# Patient Record
Sex: MALE | URBAN - METROPOLITAN AREA
[De-identification: names, ages, dates, MRNs, and addresses within clinical notes are randomized per-mention and may not be internally consistent; named-entity substitution may affect disease eponyms.]

---

## 2019-11-02 ENCOUNTER — INPATIENT (INPATIENT)
Facility: HOSPITAL | Age: 54
LOS: 2 days | Discharge: HOME | DRG: 305 | End: 2019-11-05
Attending: INTERNAL MEDICINE | Admitting: INTERNAL MEDICINE
Payer: COMMERCIAL

## 2019-11-02 VITALS
OXYGEN SATURATION: 99 % | DIASTOLIC BLOOD PRESSURE: 116 MMHG | TEMPERATURE: 99 F | WEIGHT: 179.9 LBS | HEART RATE: 55 BPM | SYSTOLIC BLOOD PRESSURE: 218 MMHG | RESPIRATION RATE: 17 BRPM

## 2019-11-02 LAB
ALBUMIN SERPL ELPH-MCNC: 4.3 G/DL — SIGNIFICANT CHANGE UP (ref 3.3–5)
ALP SERPL-CCNC: 87 U/L — SIGNIFICANT CHANGE UP (ref 40–120)
ALT FLD-CCNC: 6 U/L — LOW (ref 10–45)
ANION GAP SERPL CALC-SCNC: 8 MMOL/L — SIGNIFICANT CHANGE UP (ref 5–17)
APTT BLD: 29 SEC — SIGNIFICANT CHANGE UP (ref 27.5–36.3)
AST SERPL-CCNC: 12 U/L — SIGNIFICANT CHANGE UP (ref 10–40)
BASOPHILS # BLD AUTO: 0.03 K/UL — SIGNIFICANT CHANGE UP (ref 0–0.2)
BASOPHILS NFR BLD AUTO: 0.6 % — SIGNIFICANT CHANGE UP (ref 0–2)
BILIRUB SERPL-MCNC: 0.5 MG/DL — SIGNIFICANT CHANGE UP (ref 0.2–1.2)
BUN SERPL-MCNC: 9 MG/DL — SIGNIFICANT CHANGE UP (ref 7–23)
CALCIUM SERPL-MCNC: 9.1 MG/DL — SIGNIFICANT CHANGE UP (ref 8.4–10.5)
CHLORIDE SERPL-SCNC: 103 MMOL/L — SIGNIFICANT CHANGE UP (ref 96–108)
CK MB CFR SERPL CALC: 1.8 NG/ML — SIGNIFICANT CHANGE UP (ref 0–6.7)
CK SERPL-CCNC: 112 U/L — SIGNIFICANT CHANGE UP (ref 30–200)
CO2 SERPL-SCNC: 28 MMOL/L — SIGNIFICANT CHANGE UP (ref 22–31)
CREAT SERPL-MCNC: 1.43 MG/DL — HIGH (ref 0.5–1.3)
EOSINOPHIL # BLD AUTO: 0.05 K/UL — SIGNIFICANT CHANGE UP (ref 0–0.5)
EOSINOPHIL NFR BLD AUTO: 1.1 % — SIGNIFICANT CHANGE UP (ref 0–6)
GLUCOSE SERPL-MCNC: 86 MG/DL — SIGNIFICANT CHANGE UP (ref 70–99)
HCT VFR BLD CALC: 39.5 % — SIGNIFICANT CHANGE UP (ref 39–50)
HGB BLD-MCNC: 12.9 G/DL — LOW (ref 13–17)
IMM GRANULOCYTES NFR BLD AUTO: 0.2 % — SIGNIFICANT CHANGE UP (ref 0–1.5)
INR BLD: 1.08 — SIGNIFICANT CHANGE UP (ref 0.88–1.16)
LIDOCAIN IGE QN: 16 U/L — SIGNIFICANT CHANGE UP (ref 7–60)
LYMPHOCYTES # BLD AUTO: 1.55 K/UL — SIGNIFICANT CHANGE UP (ref 1–3.3)
LYMPHOCYTES # BLD AUTO: 32.8 % — SIGNIFICANT CHANGE UP (ref 13–44)
MCHC RBC-ENTMCNC: 31.4 PG — SIGNIFICANT CHANGE UP (ref 27–34)
MCHC RBC-ENTMCNC: 32.7 GM/DL — SIGNIFICANT CHANGE UP (ref 32–36)
MCV RBC AUTO: 96.1 FL — SIGNIFICANT CHANGE UP (ref 80–100)
MONOCYTES # BLD AUTO: 0.47 K/UL — SIGNIFICANT CHANGE UP (ref 0–0.9)
MONOCYTES NFR BLD AUTO: 9.9 % — SIGNIFICANT CHANGE UP (ref 2–14)
NEUTROPHILS # BLD AUTO: 2.62 K/UL — SIGNIFICANT CHANGE UP (ref 1.8–7.4)
NEUTROPHILS NFR BLD AUTO: 55.4 % — SIGNIFICANT CHANGE UP (ref 43–77)
NRBC # BLD: 0 /100 WBCS — SIGNIFICANT CHANGE UP (ref 0–0)
NT-PROBNP SERPL-SCNC: 1287 PG/ML — HIGH (ref 0–300)
PCP SPEC-MCNC: SIGNIFICANT CHANGE UP
PLATELET # BLD AUTO: 132 K/UL — LOW (ref 150–400)
POTASSIUM SERPL-MCNC: 4 MMOL/L — SIGNIFICANT CHANGE UP (ref 3.5–5.3)
POTASSIUM SERPL-SCNC: 4 MMOL/L — SIGNIFICANT CHANGE UP (ref 3.5–5.3)
PROT SERPL-MCNC: 7 G/DL — SIGNIFICANT CHANGE UP (ref 6–8.3)
PROTHROM AB SERPL-ACNC: 12.2 SEC — SIGNIFICANT CHANGE UP (ref 10–12.9)
RBC # BLD: 4.11 M/UL — LOW (ref 4.2–5.8)
RBC # FLD: 12.8 % — SIGNIFICANT CHANGE UP (ref 10.3–14.5)
SODIUM SERPL-SCNC: 139 MMOL/L — SIGNIFICANT CHANGE UP (ref 135–145)
TROPONIN T SERPL-MCNC: <0.01 NG/ML — SIGNIFICANT CHANGE UP (ref 0–0.01)
TROPONIN T SERPL-MCNC: <0.01 NG/ML — SIGNIFICANT CHANGE UP (ref 0–0.01)
WBC # BLD: 4.73 K/UL — SIGNIFICANT CHANGE UP (ref 3.8–10.5)
WBC # FLD AUTO: 4.73 K/UL — SIGNIFICANT CHANGE UP (ref 3.8–10.5)

## 2019-11-02 PROCEDURE — 93010 ELECTROCARDIOGRAM REPORT: CPT | Mod: 76

## 2019-11-02 PROCEDURE — 99291 CRITICAL CARE FIRST HOUR: CPT

## 2019-11-02 PROCEDURE — 71045 X-RAY EXAM CHEST 1 VIEW: CPT | Mod: 26

## 2019-11-02 RX ORDER — HYDRALAZINE HCL 50 MG
25 TABLET ORAL EVERY 8 HOURS
Refills: 0 | Status: DISCONTINUED | OUTPATIENT
Start: 2019-11-02 | End: 2019-11-02

## 2019-11-02 RX ORDER — AMLODIPINE BESYLATE 2.5 MG/1
5 TABLET ORAL DAILY
Refills: 0 | Status: DISCONTINUED | OUTPATIENT
Start: 2019-11-02 | End: 2019-11-02

## 2019-11-02 RX ORDER — LABETALOL HCL 100 MG
10 TABLET ORAL ONCE
Refills: 0 | Status: COMPLETED | OUTPATIENT
Start: 2019-11-02 | End: 2019-11-02

## 2019-11-02 RX ORDER — HYDRALAZINE HCL 50 MG
10 TABLET ORAL ONCE
Refills: 0 | Status: COMPLETED | OUTPATIENT
Start: 2019-11-02 | End: 2019-11-02

## 2019-11-02 RX ORDER — HYDRALAZINE HCL 50 MG
25 TABLET ORAL EVERY 8 HOURS
Refills: 0 | Status: DISCONTINUED | OUTPATIENT
Start: 2019-11-02 | End: 2019-11-03

## 2019-11-02 RX ORDER — AMLODIPINE BESYLATE 2.5 MG/1
5 TABLET ORAL DAILY
Refills: 0 | Status: DISCONTINUED | OUTPATIENT
Start: 2019-11-03 | End: 2019-11-04

## 2019-11-02 RX ORDER — NICARDIPINE HYDROCHLORIDE 30 MG/1
5 CAPSULE, EXTENDED RELEASE ORAL
Qty: 40 | Refills: 0 | Status: DISCONTINUED | OUTPATIENT
Start: 2019-11-02 | End: 2019-11-04

## 2019-11-02 RX ORDER — HYDRALAZINE HCL 50 MG
25 TABLET ORAL ONCE
Refills: 0 | Status: COMPLETED | OUTPATIENT
Start: 2019-11-02 | End: 2019-11-02

## 2019-11-02 RX ORDER — CHLORHEXIDINE GLUCONATE 213 G/1000ML
1 SOLUTION TOPICAL
Refills: 0 | Status: DISCONTINUED | OUTPATIENT
Start: 2019-11-02 | End: 2019-11-04

## 2019-11-02 RX ADMIN — Medication 10 MILLIGRAM(S): at 11:31

## 2019-11-02 RX ADMIN — AMLODIPINE BESYLATE 5 MILLIGRAM(S): 2.5 TABLET ORAL at 15:20

## 2019-11-02 RX ADMIN — Medication 10 MILLIGRAM(S): at 12:15

## 2019-11-02 RX ADMIN — Medication 25 MILLIGRAM(S): at 21:54

## 2019-11-02 RX ADMIN — NICARDIPINE HYDROCHLORIDE 25 MG/HR: 30 CAPSULE, EXTENDED RELEASE ORAL at 16:54

## 2019-11-02 RX ADMIN — Medication 25 MILLIGRAM(S): at 12:57

## 2019-11-02 RX ADMIN — Medication 10 MILLIGRAM(S): at 12:57

## 2019-11-02 RX ADMIN — Medication 10 MILLIGRAM(S): at 16:11

## 2019-11-02 NOTE — ED PROVIDER NOTE - OBJECTIVE STATEMENT
54M pmh smoking, no other known dz -has not seen MD in ~10yrs, denies hx htn, hld, dm, p/w htn & abnormal ekg. Pt seen at  for physical for job at . BP noted for 190/110 and EKG w/ TWI, thus sent to ED. Pt w/o somatic complaint, no cp, no sob, no n/v, no vision change, no ha, no f/c.

## 2019-11-02 NOTE — ED PROVIDER NOTE - NS ED MD EKG STATUS PRIOR 2
Dear ePdro Pablo Reina D.O. , Thank you for the referral of Mini Howe.  Overall significant improvement with the patient.  Please see my note for more details Should you have any questions or concerns please do not hesitate to contact me. Markel Arellano M.D.  the EKG is unchanged from prior EKG

## 2019-11-02 NOTE — H&P ADULT - NSHPLABSRESULTS_GEN_ALL_CORE
12.9   4.73  )-----------( 132      ( 02 Nov 2019 11:14 )             39.5     11-02    139  |  103  |  9   ----------------------------<  86  4.0   |  28  |  1.43<H>    Ca    9.1      02 Nov 2019 11:14    TPro  7.0  /  Alb  4.3  /  TBili  0.5  /  DBili  x   /  AST  12  /  ALT  6<L>  /  AlkPhos  87  11-02    LIVER FUNCTIONS - ( 02 Nov 2019 11:14 )  Alb: 4.3 g/dL / Pro: 7.0 g/dL / ALK PHOS: 87 U/L / ALT: 6 U/L / AST: 12 U/L / GGT: x           PT/INR - ( 02 Nov 2019 11:14 )   PT: 12.2 sec;   INR: 1.08          PTT - ( 02 Nov 2019 11:14 )  PTT:29.0 sec    CARDIAC MARKERS ( 02 Nov 2019 15:05 )  x     / <0.01 ng/mL / x     / x     / x      CARDIAC MARKERS ( 02 Nov 2019 11:14 )  x     / <0.01 ng/mL / 112 U/L / x     / 1.8 ng/mL        Radiology and other tests: Reviewed

## 2019-11-02 NOTE — ED ADULT TRIAGE NOTE - PAIN: PRESENCE, MLM
SUBJECTIVE:   Antonio Canseco is a 49 year old male who presents to clinic today for the following health issues:    Morbid obesity (H)  (primary encounter diagnosis)  Body mass index is 46.72 kg/(m^2).     - Interested in weight loss program    Irritable bowel syndrome without diarrhea - Bilateral abdominal pain when urinary urgency occurs. Interested in further Tx. Does not occur as frequently as it used to, though does occur intermittently.     Essential hypertension controlledHypertension Follow-up  BP Readings from Last 6 Encounters:   10/05/18 117/78   06/01/18 (!) 152/98   04/12/18 (!) 142/94   01/18/16 148/88   08/25/15 128/83   12/22/14 156/90   Outpatient blood pressures are not being checked.    Low Salt Diet: no added salt    Amount of exercise or physical activity: None    Problems taking medications regularly: No    Medication side effects: dry mouth and constipation     Diet: regular (no restrictions)    History of colonic polyps - Receivedletter from gastro about colonoscopy (due). States that he has been waiting for winter to schedule.      Screening for HIV (human immunodeficiency virus) routine    Current Outpatient Prescriptions   Medication     buPROPion (WELLBUTRIN XL) 150 MG 24 hr tablet     DULoxetine (CYMBALTA) 60 MG EC capsule     lisinopril (PRINIVIL/ZESTRIL) 20 MG tablet     No current facility-administered medications for this visit.          Problem list and histories reviewed & adjusted, as indicated.  Additional history: as documented    Past Medical History:   Diagnosis Date     CARDIOVASCULAR SCREENING; LDL GOAL LESS THAN 160 3/27/2012     Colon adenoma      Cough 2/4/2014     Degeneration of thoracic intervertebral disc 12/11/2013     Elevated blood pressure 12/22/2014     Hepatic cyst 5/22/2018     Hepatic steatosis 12/11/2013     History of colonic polyps 12/11/2013     Irritable bowel syndrome without diarrhea 6/1/2018     Microscopic hematuria 12/19/2013     Nephrolithiasis   [facility] PROGRESS NOTE    12/5/2018 12:56 PM        Name: Annika Peña . Admitted: 11/28/2018  Primary Care Provider: Alissa Machuca MD (Tel: [phone])    Brief Course:  Admitted with hypoxia    Subjective: better     Reviewed interval ancillary notes    Current Medications    lisinopril (PRINIVIL;ZESTRIL) tablet 2.5 mg Daily   amLODIPine (NORVASC) tablet 2.5 mg Nightly   warfarin (COUMADIN) tablet 5 mg Daily   spironolactone (ALDACTONE) tablet 12.5 mg Daily   sodium chloride flush 0.9 % injection 10 mL PRN   vitamin C (ASCORBIC ACID) tablet 500 mg Daily   ferrous sulfate tablet 325 mg BID   levothyroxine (SYNTHROID) tablet 75 mcg Daily   pantoprazole (PROTONIX) tablet 40 mg QAM AC   sodium chloride flush 0.9 % injection 10 mL 2 times per day   sodium chloride flush 0.9 % injection 10 mL PRN   magnesium hydroxide (MILK OF MAGNESIA) 400 MG/5ML suspension 30 mL Daily PRN   ondansetron (ZOFRAN) injection 4 mg Q6H PRN       Objective:  /68   Pulse 88   Temp 99 °F (37.2 °C) (Temporal)   Resp 16   Ht 5' 2\" (1.575 m)   Wt 161 lb 3.2 oz (73.1 kg)   SpO2 94%   BMI 29.48 kg/m²     Intake/Output Summary (Last 24 hours) at 12/05/18 1256  Last data filed at 12/05/18 0912   Gross per 24 hour   Intake              600 ml   Output                0 ml   Net              600 ml      Wt Readings from Last 3 Encounters:   12/05/18 161 lb 3.2 oz (73.1 kg)   11/08/18 130 lb (59 kg)   10/19/18 145 lb (65.8 kg)       General appearance:  Appears comfortable  Eyes: Sclera clear. Pupils equal.  ENT: Moist oral mucosa. Trachea midline, no adenopathy. Cardiovascular: Regular rhythm, normal S1, S2. No murmur. No edema in lower extremities  Respiratory: Not using accessory muscles. Good inspiratory effort. Clear to auscultation bilaterally, no wheeze or crackles.    GI: Abdomen soft, no tenderness, not distended, normal bowel     Obesity 3/27/2012     BRIAN on CPAP 3/27/2012     Other irritable bowel syndrome 5/22/2018     Pain in thoracic spine 5/9/2013     Pulmonary nodule 5/22/2018     Right-sided chest pain 4/13/2018     Sleep apnea      Past Surgical History:   Procedure Laterality Date     COLONOSCOPY       CYSTOSCOPY  2/11/2014    Procedure: CYSTOSCOPY;   Video Cystoscopy with Exam Under Anesthesia;  Surgeon: Chente Moeller MD;  Location: [facility] OR     wisdom teeth       Family History   Problem Relation Age of Onset     Family History Negative Mother      Cancer Father      lymphoma     Social History   Substance Use Topics     Smoking status: Former Smoker     Packs/day: 1.00     Types: Cigarettes     Quit date: 4/23/2013     Smokeless tobacco: Never Used      Comment: 1 sd/day     Alcohol use Yes      Comment: rarely     Reviewed and updated as needed this visit by clinical staff  Allergies  Meds       Reviewed and updated as needed this visit by Provider       ROS: no hematochezia, hematuria, no systemic symptoms, no thyroid sx    This document serves as a record of the services and decisions personally performed and made by Mynor Mason MD. It was created on his behalf by Blair Sahu, a trained medical scribe.  The creation of this document is based on the scribe's personal observations and the provider's statements to the medical scribe.  Blair Sahu, October 5, 2018 2:56 PM  OBJECTIVE:   /78 (BP Location: Left arm, Patient Position: Chair, Cuff Size: Adult Large)  Pulse 64  Temp 97.9  F (36.6  C) (Oral)  Resp 16  Wt (!) 152 kg (335 lb)  SpO2 98%  BMI 46.72 kg/m2  Body mass index is 46.72 kg/(m^2).  GENERAL: Healthy, Alert, No acute distress   ABDOMEN: Obese, benign    Diagnostic Test Results:  No results found for this or any previous visit (from the past 24 hour(s)).  ASSESSMENT/PLAN:   (E66.01) Morbid obesity (H)  (primary encounter diagnosis)  Comment: Multidisciplinary weight loss program  discussed.  Plan: BARIATRIC ADULT REFERRAL        30 grams fiber (3 servings vegetables /  fruits each meal)        1 serving carbohydrate (bread potatoes) daily    (K58.9) Irritable bowel syndrome without diarrhea  Comment: Refilled   Plan: DULoxetine (CYMBALTA) 60 MG EC capsule,        Add BuPROPion (WELLBUTRIN XL) 150 MG 24 hr tablet          (I10) Essential hypertension  Comment: Controlled   Plan: lisinopril (PRINIVIL/ZESTRIL) 20 MG tablet,         Comprehensive metabolic panel          (Z86.010) History of colonic polyps  Comment: Patient agreed to schedule   Plan: GASTROENTEROLOGY ADULT REF PROCEDURE ONLY         Morenita Garcia [phone]; No Provider         Preference          (Z11.4) Screening for HIV (human immunodeficiency virus)  Comment: Universal HIV testing introduced, rationale reviewed, all questions answered, permission granted to test  Plan: HIV Antigen Antibody Combo          The information in this document, created by the medical scribe for me, accurately reflects the services I personally performed and the decisions made by me. I have reviewed and approved this document for accuracy prior to leaving the patient care area.  Mynor Mason MD October 5, 2018 2:57 PM    Mynor Mason MD  [facility]    hypertension) ([facility] [address]    Nonrheumatic aortic valve stenosis    Pulmonary hypertension (HCC)    Acute encephalopathy  Resolved Problems:    * No resolved hospital problems. *       Assessment & Plan:       Acute renal failure, resolved, low-dose lisinopril per nephrology recommendation, appreciate nephrology input, follow up PCP/nephrology for outpatient monitoring of creatinine. Hypotension due to antihypertensives : Resolvedmedications Adjusted  Hypokalemia, replaced  Acute metabolic encephalopathy ;   resolved, CT head negative for acute intracranial hemorrhage, apt. Neurology input;    chronic hypoxic resp. Failure; multifactorial; home  O2 ordered;  FU Pulmonary   VQ scan noted to be high probability  for PE:  Possible  CTEPH.   No pulmonary angiogram; Patient is not interested in aggressive management  Chronic anticoagulation,  Changed   to coumadin per oncology  ; FU INR   Remote history of smoking   History of pulmonary embolism  Pulmonary hypertension, likely multifactorial due to aortic stenosis, chronic diastolic HF, appreciate cardiology input  Synovial cyst of knee   joint: Follow PCP  Disposition: Home with home health care  Follow-up, PCP, cardiology, nephrology, pulmonary, hematology  Discussed with patient management/discharge plan          Ynacy Martel MD   12/5/2018 12:56 PM denies pain/discomfort

## 2019-11-02 NOTE — H&P ADULT - HISTORY OF PRESENT ILLNESS
55 y/o man, smoker, with no known PMHx who presents to ED from urgent care with HTN and abnormal EKG. Patient visited urgent care for employment physical. Concern for HTN emergency with sBP 190s/110 with EKG TWI in leads I, aVL, v5, and V6. On arrival at [facility] ED, pt with sBP in 210s-240s. Patient denies any CP, SOB, nausea, vomiting, vision changes, headaches, fevers, or chills.    ED Vitals: T 98.6F, /116, HR 55, RR 17, SpO2 99 on RA  ED Labs: WBC 4.73, Hb 12.9, Plt 132, K+ 4.0, Cr 1.43, BUN 9, AST 12, ALT 6, Troponin <0.01, CKMB 1.8, pro-BNP 1287  ED management: s/p labetalol 10mg IVP x2, IV hydralazine 10mg x1, 25mg PO hydralazine x1 53 y/o man, smoker, with no known PMHx who presents to ED from urgent care with HTN and abnormal EKG. Patient visited urgent care today for his employment physical to work at a . Pt was sent to ED due to concern for HTN emergency with sBP 190s/110 with EKG TWI in leads I, aVL, v5, and V6. On arrival at [facility] ED, pt with sBP in 210s-240s. Patient denies any CP, SOB, palpitations, cough, nausea, vomiting, vision changes, headaches, fevers, or chills, difficulty with urination.    ED Vitals: T 98.6F, /116, HR 55, RR 17, SpO2 99 on RA  ED Labs: WBC 4.73, Hb 12.9, Plt 132, K+ 4.0, Cr 1.43, BUN 9, AST 12, ALT 6, Troponin <0.01, CKMB 1.8, pro-BNP 1287  ED management: s/p labetalol 10mg IVP x2, IV hydralazine 10mg x1, 25mg PO hydralazine x1 55 y/o man, smoker, with no known PMHx who presents to ED from urgent care with HTN and abnormal EKG. Patient visited urgent care today for his employment physical to work at a . Pt was sent to ED due to concern for HTN emergency with sBP 190s/110 with EKG TWI in leads I, aVL, v5, and V6. On arrival at [facility] ED, pt with sBP in 210s-240s. Patient denies any CP, SOB, palpitations, cough, nausea, vomiting, vision changes, headaches, fevers, or chills, difficulty with urination.    ED Vitals: T 98.6F, /116, HR 55, RR 17, SpO2 99 on RA  ED Labs: WBC 4.73, Hb 12.9, Plt 132, K+ 4.0, Cr 1.43, BUN 9, AST 12, ALT 6, Troponin <0.01, CKMB 1.8, pro-BNP 1287  ED imaging/tests: EKG with TWI in leads I, aVL, V5, V6 and ST elevations in V1, V2, V3. CXR without acute pathologies, no infiltrates.  ED management: s/p labetalol 10mg IVP x2, IV hydralazine 10mg x1, 25mg PO hydralazine x1 55 y/o man, smoker, with no known PMHx who presents to ED from urgent care with HTN and abnormal EKG. Patient visited urgent care today for his employment physical to work at a . Pt was sent to ED due to concern for HTN emergency with sBP 190s/110 with EKG TWI in leads I, aVL, v5, and V6. On arrival at [facility] ED, pt with sBP in 210s-240s. Patient denies any CP, SOB, palpitations, cough, nausea, vomiting, vision changes, headaches, fevers, or chills, difficulty with urination.    ED Vitals: T 98.6F, /116, HR 55, RR 17, SpO2 99 on RA  ED Labs: WBC 4.73, Hb 12.9, Plt 132, K+ 4.0, Cr 1.43, BUN 9, AST 12, ALT 6, Troponin <0.01, CKMB 1.8, pro-BNP 1287, UDS +THC  ED imaging/tests: EKG with TWI in leads I, aVL, V5, V6 and ST elevations in V1, V2, V3. CXR without acute pathologies, no infiltrates.  ED management: s/p labetalol 10mg IVP x2, IV hydralazine 10mg x1, 25mg PO hydralazine x1 55 y/o man with no known PMHx who presents to ED from urgent care with HTN and abnormal EKG. Patient visited urgent care today for his employment physical to work at a . Pt was sent to ED due to concern for HTN emergency with sBP 190s/110 with EKG TWI in leads I, aVL, v5, and V6. On arrival at [facility] ED, pt with sBP in 210s-240s. Patient denies any CP, SOB, palpitations, cough, nausea, vomiting, vision changes, headaches, fevers, or chills, difficulty with urination.    ED Vitals: T 98.6F, /116, HR 55, RR 17, SpO2 99 on RA  ED Labs: WBC 4.73, Hb 12.9, Plt 132, K+ 4.0, Cr 1.43, BUN 9, AST 12, ALT 6, Troponin <0.01, CKMB 1.8, pro-BNP 1287, UDS +THC  ED imaging/tests: EKG with TWI in leads I, aVL, V5, V6 and ST elevations in V1, V2, V3. CXR without acute pathologies, no infiltrates.  ED management: s/p labetalol 10mg IVP x2, IV hydralazine 10mg x1, 25mg PO hydralazine x1

## 2019-11-02 NOTE — H&P ADULT - ATTENDING COMMENTS
53 y/o man with no known PMHx who presents to ED from urgent care with HTN and abnormal EKG. Patient visited urgent care today for his employment physical to work at a . Pt was sent to ED due to concern for HTN emergency with sBP 190s/110 with EKG TWI in leads I, aVL, v5, and V6. On arrival at [facility] ED, pt with sBP in 210s-240s. Patient denies any CP, SOB, palpitations, cough, nausea, vomiting, vision changes, headaches, fevers, or chills, difficulty with urination.    ED Vitals: T 98.6F, /116, HR 55, RR 17, SpO2 99 on RA    EKG TWI in leads I, aVL, v5, and V6. Started on oral anti-HTN medications and initiated nicardipine gtt due to persistent sBP>200s.    Long discussion about need to treat hypertension on a chronic basis, even if no symptoms. Emphasized taking meds, risk of MI and CVA, regular follow-up and 2 gm Na diet.     CARDIOVASCULAR  #HTN urgency  - sBP persistently >200s, asymptomatic, no CP, SOB, headaches, difficulty with urination  - home BP unknown, pt has not seen physician in about 10 years  Will use a regiment of amlodipine, hydralazine and Lisinopril. Do not want to lower BP too rapidly.  May need 4th agent (consider chlorthalidone) in near future after current 3 meds maximized.    #T-wave Inversions  - EKG with TWI in leads I, aVL, v5, and V6 at urgent care  LVH  - Troponins neg x2  - UDS positive for only THC  I have personally provided 30 minutes of critical care time concurrently with the fellow.  I have reviewed the fellow’s documentation and I agree with the fellow's assessment and plan of care.  JAYSHREE Frost

## 2019-11-02 NOTE — ED PROVIDER NOTE - CLINICAL SUMMARY MEDICAL DECISION MAKING FREE TEXT BOX
54M pmh smoking, no other known dz -has not seen MD in ~10yrs, denies hx htn, hld, dm, p/w htn & abnormal ekg. Pt seen at  for physical for job at . BP noted for 190/110 and EKG w/ TWI, thus sent to ED. Pt w/o somatic complaint, no cp, no sob, no n/v, no vision change, no ha, no f/c. Exam w/ htn, otherwise w/o finding. EKG OSH shows TWI I/aVL, v5/v6, Concern htn emergency given  and 233 on repeat, LBBB does not meet sgarbossa criteria, consider acs, untreated htn. 54M pmh smoking, no other known dz -has not seen MD in ~10yrs, denies hx htn, hld, dm, p/w htn & abnormal ekg. Pt seen at  for physical for job at . BP noted for 190/110 and EKG w/ TWI, thus sent to ED. Pt w/o somatic complaint, no cp, no sob, no n/v, no vision change, no ha, no f/c. Exam w/ htn, otherwise w/o finding. EKG OSH shows TWI I/aVL, v5/v6, Concern htn emergency given  and 233 on repeat, LBBB does not meet sgarbossa criteria, consider acs, untreated htn. Pt required multiple IV doses of labetalol, then hydralazine PO & IV, still hypertensive, admitted CCU

## 2019-11-02 NOTE — ED PROVIDER NOTE - DIAGNOSTIC INTERPRETATION
Chest x-ray interpreted by ER Physician Dr. Hall  Findings: heart size normal, no infiltrates, no pleural effusion, no PTX, no appreciated fracture.

## 2019-11-02 NOTE — ED PROVIDER NOTE - NOTES
Advised hx, EKG, exam, tx thus far, BP still elevated after 10 labetalol but will recheck in few minutes, labs pending.

## 2019-11-02 NOTE — ED PROVIDER NOTE - PROGRESS NOTE DETAILS
/113 after labetolol, rpt dose given. Cards at bedside, advised neg trop, BNP 1200~, BP elevated again, cards at bedside, recs admit ccu given efforts thus far to control bp ineffective.

## 2019-11-02 NOTE — ED ADULT NURSE NOTE - OBJECTIVE STATEMENT
55 y/o male BIBA for cardiac evaluation. as per report " pt was getting a physical for a new job and found to have st- inversions on the ekg and hypertensive" pt   denies any medical problems, cp, sob but states " didn't have insurance and haven't check himself in a while" pt noted hypertensive, kept on cardiac monitoring, MD Hall at bedside, evaluating pt,.

## 2019-11-02 NOTE — H&P ADULT - ASSESSMENT
55 y/o man, current smoker, with no known PMHx and has not seen a physician in 10 years, who presents to ED for HTN and abnormal EKG. Admitted for HTN emergency sBP>200s with EKG TWI in leads I, aVL, v5, and V6.     CARDIOVASCULAR  #HTN Emergency  - sBP persistently >200s, asymptomatic, no CP, SOB, headaches, difficulty with urination  - s/p labetalol 10mg IVP x2, IV hydralazine 10mg x1, 25mg PO hydralazine x1  - started on 25mg PO hydral TID, amlodipine 5mg po qd  - pt still persistently hypertensive with sBP>200s  - plan to start patient on nitroglycerin gtt    #T-wave Inversions  - EKG with TWI in leads I, aVL, v5, and V6  - Troponins neg x1  - UDS positive for only THC  - monitor on telemetry    HEME/ONC  #Anemia  - Pt with Hb 12.9, MCV 96.1  - no signs of bleeding  - continue to monitor    #Thrombocytopenia  - Pt 132  - no signs of bleeding  - continue to monitor    RENAL  #DANAY  - Pt with Cr 1.43, baseline unknown as pt has not seen a doctor in the past 10 years    Plan:   F: none  E: replete K<4, Mg<2  N: DASH/TLC  VTE Prophylaxis: hep subQ, SCDs  C: Full Code  D: CCU 53 y/o man, current smoker, with no known PMHx and has not seen a physician in 10 years, who presents to ED for HTN and abnormal EKG. Admitted for HTN emergency sBP>200s with EKG TWI in leads I, aVL, v5, and V6. Started on oral anti-HTN medications and initiated nicardipine gtt due to persistent sBP>200s.    CARDIOVASCULAR  #HTN Emergency  - sBP persistently >200s, asymptomatic, no CP, SOB, headaches, difficulty with urination  - home BP unknown, pt has not seen physician in about 10 years  - s/p labetalol 10mg IVP x2, IV hydralazine 10mg x1, 25mg PO hydralazine x1  - started on 25mg PO hydral TID, amlodipine 5mg po qd  - initiated nicardipine gtt at 5mg/hr due to persistent  sBP>200s despite current anti-HTN regimen    #T-wave Inversions  - EKG with TWI in leads I, aVL, v5, and V6 at urgent care  - Troponins neg x1  - UDS positive for only THC  - pt asymptomatic, no CP, SOB  - daily EKGs  - monitor on telemetry    HEME/ONC  #Anemia  - Pt with Hb 12.9, MCV 96.1  - no signs of bleeding  - continue to monitor    #Thrombocytopenia  - Pt 132  - no signs of bleeding  - continue to monitor    RENAL  #DANAY  - Pt with Cr 1.43, baseline unknown as pt has not seen a doctor in the past 10 years    PULMONARY  No acute issues    NEUROLOGY  No acute issues    ENDOCRINE  No acute issues    MUSCULOSKELETAL  No acute issues      F: none  E: replete K<4, Mg<2  N: DASH/TLC  VTE Prophylaxis: hep subQ, SCDs  C: Full Code  D: CCU 55 y/o man, current smoker, with no known PMHx and has not seen a physician in 10 years, who presents to ED for HTN and abnormal EKG. Admitted for HTN emergency sBP>200s with EKG TWI in leads I, aVL, v5, and V6. Started on oral anti-HTN medications and initiated nicardipine gtt due to persistent sBP>200s.    CARDIOVASCULAR  #HTN Emergency  - sBP persistently >200s, asymptomatic, no CP, SOB, headaches, difficulty with urination  - home BP unknown, pt has not seen physician in about 10 years  - s/p labetalol 10mg IVP x2, IV hydralazine 10mg x1, 25mg PO hydralazine x1  - started on 25mg PO hydral TID, amlodipine 5mg po qd, now held given initiation of nicardipine gtt  - initiated nicardipine gtt at 5mg/hr due to persistent  sBP>200s despite current anti-HTN regimen    #T-wave Inversions  - EKG with TWI in leads I, aVL, v5, and V6 at urgent care  - Troponins neg x1  - UDS positive for only THC  - pt asymptomatic, no CP, SOB  - daily EKGs  - monitor on telemetry    HEME/ONC  #Anemia  - Pt with Hb 12.9, MCV 96.1  - no signs of bleeding  - continue to monitor    #Thrombocytopenia  - Pt 132  - no signs of bleeding  - continue to monitor    RENAL  #DANAY  - Pt with Cr 1.43, baseline unknown as pt has not seen a doctor in the past 10 years    PULMONARY  No acute issues    NEUROLOGY  No acute issues    ENDOCRINE  No acute issues    MUSCULOSKELETAL  No acute issues      F: none  E: replete K<4, Mg<2  N: DASH/TLC  VTE Prophylaxis: hep subQ, SCDs  C: Full Code  D: CCU 55 y/o man, current smoker, with no known PMHx and has not seen a physician in 10 years, who presents to ED for HTN and abnormal EKG. Admitted for HTN emergency sBP>200s with EKG TWI in leads I, aVL, v5, and V6. Started on oral anti-HTN medications and initiated nicardipine gtt due to persistent sBP>200s.    CARDIOVASCULAR  #HTN Emergency  - sBP persistently >200s, asymptomatic, no CP, SOB, headaches, difficulty with urination  - home BP unknown, pt has not seen physician in about 10 years  - s/p labetalol 10mg IVP x2, IV hydralazine 10mg x1, 25mg PO hydralazine x1  - started on 25mg PO hydral TID, amlodipine 5mg po qd  - initiated nicardipine gtt at 5mg/hr due to persistent  sBP>200s despite current anti-HTN regimen    #T-wave Inversions  - EKG with TWI in leads I, aVL, v5, and V6 at urgent care  - Troponins neg x1  - UDS positive for only THC  - pt asymptomatic, no CP, SOB  - daily EKGs  - monitor on telemetry    HEME/ONC  #Anemia  - Pt with Hb 12.9, MCV 96.1  - no signs of bleeding  - continue to monitor    #Thrombocytopenia  - Pt 132  - no signs of bleeding  - continue to monitor    RENAL  #DANAY  - Pt with Cr 1.43, baseline unknown as pt has not seen a doctor in the past 10 years    PULMONARY  No acute issues    NEUROLOGY  No acute issues    ENDOCRINE  No acute issues    MUSCULOSKELETAL  No acute issues      F: none  E: replete K<4, Mg<2  N: DASH/TLC  VTE Prophylaxis: hep subQ, SCDs  C: Full Code  D: CCU 55 y/o man, current smoker, with no known PMHx and has not seen a physician in 10 years, who presents to ED for HTN and abnormal EKG. Admitted for HTN emergency sBP>200s with EKG TWI in leads I, aVL, v5, and V6. Started on oral anti-HTN medications and initiated nicardipine gtt due to persistent sBP>200s.    CARDIOVASCULAR  #HTN Emergency  - sBP persistently >200s, asymptomatic, no CP, SOB, headaches, difficulty with urination  - home BP unknown, pt has not seen physician in about 10 years  - s/p labetalol 10mg IVP x2, IV hydralazine 10mg x1, 25mg PO hydralazine x1  - started on 25mg PO hydral TID, amlodipine 5mg po qd  - initiated nicardipine gtt at 5mg/hr due to persistent  sBP>200s despite current anti-HTN regimen    #T-wave Inversions  - EKG with TWI in leads I, aVL, v5, and V6 at urgent care  - EKG in ED here: sinus bradycardia HR 58, TWI in leads I, aVL, V5, V6 and ST elevations in V1, V2, V3. LBBB did not meet Sgarbossa criteria  - Troponins neg x1  - UDS positive for only THC  - pt asymptomatic, no CP, SOB  - daily EKGs  - monitor on telemetry    HEME/ONC  #Anemia  - Pt with Hb 12.9, MCV 96.1  - no signs of bleeding  - continue to monitor    #Thrombocytopenia  - Pt 132  - no signs of bleeding  - continue to monitor    RENAL  #DANAY  - Pt with Cr 1.43, baseline unknown as pt has not seen a doctor in the past 10 years    PULMONARY  No acute issues    NEUROLOGY  No acute issues    ENDOCRINE  No acute issues    MUSCULOSKELETAL  No acute issues      F: none  E: replete K<4, Mg<2  N: DASH/TLC  VTE Prophylaxis: hep subQ, SCDs  C: Full Code  D: CCU 53 y/o man, current smoker, with no known PMHx and has not seen a physician in 10 years, who presents to ED for HTN and abnormal EKG. Admitted for HTN emergency sBP>200s with EKG TWI in leads I, aVL, v5, and V6. Started on oral anti-HTN medications and initiated nicardipine gtt due to persistent sBP>200s.    CARDIOVASCULAR  #HTN Emergency  - sBP persistently >200s, asymptomatic, no CP, SOB, headaches, difficulty with urination  - home BP unknown, pt has not seen physician in about 10 years  - s/p labetalol 10mg IVP x2, IV hydralazine 10mg x1, 25mg PO hydralazine x1  - started on 25mg PO hydral TID, amlodipine 5mg po qd  - initiated nicardipine gtt at 5mg/hr due to persistent  sBP>200s despite current anti-HTN regimen    #T-wave Inversions  - EKG with TWI in leads I, aVL, v5, and V6 at urgent care  - EKG in ED here: sinus bradycardia HR 58, TWI in leads I, aVL, V5, V6 and ST elevations in V1, V2, V3. LBBB did not meet Sgarbossa criteria  - Troponins neg x2  - UDS positive for only THC  - pt asymptomatic, no CP, SOB  - daily EKGs  - monitor on telemetry    HEME/ONC  #Anemia  - Pt with Hb 12.9, MCV 96.1  - no signs of bleeding  - continue to monitor    #Thrombocytopenia  - Pt 132  - no signs of bleeding  - continue to monitor    RENAL  #DANAY  - Pt with Cr 1.43, baseline unknown as pt has not seen a doctor in the past 10 years    PULMONARY  No acute issues    NEUROLOGY  No acute issues    ENDOCRINE  No acute issues    MUSCULOSKELETAL  No acute issues      F: none  E: replete K<4, Mg<2  N: DASH/TLC  VTE Prophylaxis: hep subQ, SCDs  C: Full Code  D: CCU 53 y/o man, current smoker, with no known PMHx and has not seen a physician in 10 years, who presents to ED for HTN and abnormal EKG. Admitted for HTN emergency sBP>200s with EKG TWI in leads I, aVL, v5, and V6. Started on oral anti-HTN medications and initiated nicardipine gtt due to persistent sBP>200s.    CARDIOVASCULAR  #HTN Emergency  - sBP persistently >200s, asymptomatic, no CP, SOB, headaches, difficulty with urination  - home BP unknown, pt has not seen physician in about 10 years  - s/p labetalol 10mg IVP x2, IV hydralazine 10mg x1, 25mg PO hydralazine x1  - started on 25mg PO hydral TID, amlodipine 5mg po qd  - initiated nicardipine gtt at 5mg/hr due to persistent  sBP>200s despite current anti-HTN regimen    #T-wave Inversions  - EKG with TWI in leads I, aVL, v5, and V6 at urgent care  - EKG in ED here: sinus bradycardia HR 58, TWI in leads I, aVL, V5, V6 and ST elevations in V1, V2, V3. LBBB did not meet Sgarbossa criteria  - Troponins neg x2  - UDS positive for only THC  - pt asymptomatic, no CP, SOB  - daily EKGs  - monitor on telemetry    HEME/ONC  #Anemia  - Pt no known hx of anemia, here with Hb 12.9, MCV 96.1  - no signs of bleeding  - continue to monitor with CBC    #Thrombocytopenia  - Pt 132  - no signs of bleeding  - continue to monitor with CBC    RENAL  #DANAY  - Pt with Cr 1.43, BUN 9; baseline unknown as pt has not seen a doctor in the past 10 years  - pt reports no urinary complaints  - likely intrinsic etiology 2/2 chronic HTN given HTN emergency with persistent sBP>200s    PULMONARY  No acute issues    NEUROLOGY  No acute issues    ENDOCRINE  No acute issues    MUSCULOSKELETAL  No acute issues      F: none  E: replete K<4, Mg<2  N: DASH/TLC  VTE Prophylaxis: hep subQ, SCDs  C: Full Code  D: CCU 53 y/o man with no known PMHx and has not seen a physician in 10 years, who presents to ED for HTN and abnormal EKG. Admitted for HTN emergency sBP>200s with EKG TWI in leads I, aVL, v5, and V6. Started on oral anti-HTN medications and initiated nicardipine gtt due to persistent sBP>200s.    CARDIOVASCULAR  #HTN Emergency  - sBP persistently >200s, asymptomatic, no CP, SOB, headaches, difficulty with urination  - home BP unknown, pt has not seen physician in about 10 years  - s/p labetalol 10mg IVP x2, IV hydralazine 10mg x1, 25mg PO hydralazine x1  - started on 25mg PO hydral TID, amlodipine 5mg po qd  - initiated nicardipine gtt at 5mg/hr due to persistent  sBP>200s despite current anti-HTN regimen    #T-wave Inversions  - EKG with TWI in leads I, aVL, v5, and V6 at urgent care  - EKG in ED here: sinus bradycardia HR 58, TWI in leads I, aVL, V5, V6 and ST elevations in V1, V2, V3. LBBB did not meet Sgarbossa criteria  - Troponins neg x2  - UDS positive for only THC  - pt asymptomatic, no CP, SOB  - daily EKGs  - monitor on telemetry    HEME/ONC  #Anemia  - Pt no known hx of anemia, here with Hb 12.9, MCV 96.1  - no signs of bleeding  - continue to monitor with CBC    #Thrombocytopenia  - Pt 132  - no signs of bleeding  - continue to monitor with CBC    RENAL  #DANAY  - Pt with Cr 1.43, BUN 9; baseline unknown as pt has not seen a doctor in the past 10 years  - pt reports no urinary complaints  - likely intrinsic etiology 2/2 chronic HTN given HTN emergency with persistent sBP>200s    PULMONARY  No acute issues    NEUROLOGY  No acute issues    ENDOCRINE  No acute issues    MUSCULOSKELETAL  No acute issues      F: none  E: replete K<4, Mg<2  N: DASH/TLC  VTE Prophylaxis: hep subQ, SCDs  C: Full Code  D: CCU

## 2019-11-02 NOTE — PATIENT PROFILE ADULT - NSPROGENOTHERPROVIDER_GEN_A_NUR
none

## 2019-11-02 NOTE — PROCEDURE NOTE - NSPROCDETAILS_GEN_ALL_CORE
positive blood return obtained via catheter/sutured in place/hemostasis with direct pressure, dressing applied/location identified, draped/prepped, sterile technique used, needle inserted/introduced/Seldinger technique/all materials/supplies accounted for at end of procedure/connected to a pressurized flush line

## 2019-11-02 NOTE — ED ADULT NURSE NOTE - CHIEF COMPLAINT QUOTE
patient was at [facility] for annual physical and sent in for abnormal ekg.  Patient denies any symptoms or cardiac hx.

## 2019-11-02 NOTE — ED ADULT TRIAGE NOTE - CHIEF COMPLAINT QUOTE
patient was at [facility] for annual physical and sent in for abnormal ekg.  Patient denies any symptoms or cardiac hx.

## 2019-11-02 NOTE — ED ADULT NURSE NOTE - PMH
- Has not started treatment, will need Oncology follow up and staging scans on discharge  - Family wanting to move patient to [place], may arrange follow up there at [facility]   No pertinent past medical history <<----- Click to add NO pertinent Past Medical History

## 2019-11-02 NOTE — H&P ADULT - NSHPPHYSICALEXAM_GEN_ALL_CORE
GENERAL: In NAD. Lying in bed comfortably.  HEENT: NC/AT. PERRL. EOMI. Neck supple. No JVD. No thyromegaly.  CV: RRR. +S1/S2. No murmurs, rubs or gallops  LUNGS: Clear to auscultation b/l. No wheezing, rales or rhonchi.  ABDOMEN: Soft, nontender, nondistended. +BSx4. No guarding or rebound tenderness.  EXT: WWP. No edema in b/l extremities  VASCULAR: 2+ radial, DP/PT bilaterally  SKIN: Dark pigmented nodular mole in middle of forehead. No visible rashes or wounds GENERAL: In NAD. Lying in bed comfortably.  HEENT: NC/AT. PERRL. EOMI. Neck supple. No JVD. No thyromegaly.  CV: RRR. +S1/S2. No murmurs, rubs or gallops  LUNGS: Clear to auscultation b/l. No wheezing, rales or rhonchi.  ABDOMEN: Soft, nontender, nondistended. +BSx4. No guarding or rebound tenderness.  EXT: WWP. No edema in b/l extremities  VASCULAR: 2+ radial, DP/PT bilaterally  SKIN: Dark pigmented nodular mole in middle of forehead. No visible rashes or wounds  NEURO: AAOx3. CN II-XII grossly intact. No FND.

## 2019-11-02 NOTE — H&P ADULT - NSHPSOCIALHISTORY_GEN_ALL_CORE
Patient currently lives with family. Reports to only smoke marijuana. Reports only drinking a couple of times per year. Denies any other illicit drug use. Patient currently lives with family. Reports to only smoke marijuana, denies smoking tobacco. Reports only drinking a couple of times per year. Denies any other illicit drug use.

## 2019-11-03 LAB
ALBUMIN SERPL ELPH-MCNC: 4.2 G/DL — SIGNIFICANT CHANGE UP (ref 3.3–5)
ALP SERPL-CCNC: 108 U/L — SIGNIFICANT CHANGE UP (ref 40–120)
ALT FLD-CCNC: 7 U/L — LOW (ref 10–45)
ANION GAP SERPL CALC-SCNC: 13 MMOL/L — SIGNIFICANT CHANGE UP (ref 5–17)
AST SERPL-CCNC: 15 U/L — SIGNIFICANT CHANGE UP (ref 10–40)
BILIRUB SERPL-MCNC: 0.5 MG/DL — SIGNIFICANT CHANGE UP (ref 0.2–1.2)
BUN SERPL-MCNC: 9 MG/DL — SIGNIFICANT CHANGE UP (ref 7–23)
CALCIUM SERPL-MCNC: 9.5 MG/DL — SIGNIFICANT CHANGE UP (ref 8.4–10.5)
CHLORIDE SERPL-SCNC: 99 MMOL/L — SIGNIFICANT CHANGE UP (ref 96–108)
CO2 SERPL-SCNC: 22 MMOL/L — SIGNIFICANT CHANGE UP (ref 22–31)
CREAT SERPL-MCNC: 1.02 MG/DL — SIGNIFICANT CHANGE UP (ref 0.5–1.3)
GLUCOSE SERPL-MCNC: 102 MG/DL — HIGH (ref 70–99)
HCT VFR BLD CALC: 44.4 % — SIGNIFICANT CHANGE UP (ref 39–50)
HGB BLD-MCNC: 15.1 G/DL — SIGNIFICANT CHANGE UP (ref 13–17)
HIV 1+2 AB+HIV1 P24 AG SERPL QL IA: SIGNIFICANT CHANGE UP
MAGNESIUM SERPL-MCNC: 1.7 MG/DL — SIGNIFICANT CHANGE UP (ref 1.6–2.6)
MCHC RBC-ENTMCNC: 31.4 PG — SIGNIFICANT CHANGE UP (ref 27–34)
MCHC RBC-ENTMCNC: 34 GM/DL — SIGNIFICANT CHANGE UP (ref 32–36)
MCV RBC AUTO: 92.3 FL — SIGNIFICANT CHANGE UP (ref 80–100)
NRBC # BLD: 0 /100 WBCS — SIGNIFICANT CHANGE UP (ref 0–0)
PLATELET # BLD AUTO: 147 K/UL — LOW (ref 150–400)
POTASSIUM SERPL-MCNC: 3.9 MMOL/L — SIGNIFICANT CHANGE UP (ref 3.5–5.3)
POTASSIUM SERPL-SCNC: 3.9 MMOL/L — SIGNIFICANT CHANGE UP (ref 3.5–5.3)
PROT SERPL-MCNC: 8.1 G/DL — SIGNIFICANT CHANGE UP (ref 6–8.3)
RBC # BLD: 4.81 M/UL — SIGNIFICANT CHANGE UP (ref 4.2–5.8)
RBC # FLD: 12.5 % — SIGNIFICANT CHANGE UP (ref 10.3–14.5)
SODIUM SERPL-SCNC: 134 MMOL/L — LOW (ref 135–145)
WBC # BLD: 6.25 K/UL — SIGNIFICANT CHANGE UP (ref 3.8–10.5)
WBC # FLD AUTO: 6.25 K/UL — SIGNIFICANT CHANGE UP (ref 3.8–10.5)

## 2019-11-03 PROCEDURE — 99291 CRITICAL CARE FIRST HOUR: CPT

## 2019-11-03 PROCEDURE — 93010 ELECTROCARDIOGRAM REPORT: CPT

## 2019-11-03 RX ORDER — INFLUENZA VIRUS VACCINE 15; 15; 15; 15 UG/.5ML; UG/.5ML; UG/.5ML; UG/.5ML
0.5 SUSPENSION INTRAMUSCULAR ONCE
Refills: 0 | Status: COMPLETED | OUTPATIENT
Start: 2019-11-03 | End: 2019-11-03

## 2019-11-03 RX ORDER — MAGNESIUM SULFATE 500 MG/ML
1 VIAL (ML) INJECTION ONCE
Refills: 0 | Status: COMPLETED | OUTPATIENT
Start: 2019-11-03 | End: 2019-11-03

## 2019-11-03 RX ORDER — HYDRALAZINE HCL 50 MG
25 TABLET ORAL ONCE
Refills: 0 | Status: COMPLETED | OUTPATIENT
Start: 2019-11-03 | End: 2019-11-03

## 2019-11-03 RX ORDER — HYDRALAZINE HCL 50 MG
50 TABLET ORAL EVERY 8 HOURS
Refills: 0 | Status: DISCONTINUED | OUTPATIENT
Start: 2019-11-03 | End: 2019-11-04

## 2019-11-03 RX ORDER — HYDRALAZINE HCL 50 MG
50 TABLET ORAL EVERY 8 HOURS
Refills: 0 | Status: DISCONTINUED | OUTPATIENT
Start: 2019-11-03 | End: 2019-11-03

## 2019-11-03 RX ORDER — LISINOPRIL 2.5 MG/1
20 TABLET ORAL EVERY 24 HOURS
Refills: 0 | Status: DISCONTINUED | OUTPATIENT
Start: 2019-11-03 | End: 2019-11-05

## 2019-11-03 RX ADMIN — Medication 25 MILLIGRAM(S): at 12:22

## 2019-11-03 RX ADMIN — LISINOPRIL 20 MILLIGRAM(S): 2.5 TABLET ORAL at 10:12

## 2019-11-03 RX ADMIN — Medication 25 MILLIGRAM(S): at 14:46

## 2019-11-03 RX ADMIN — NICARDIPINE HYDROCHLORIDE 25 MG/HR: 30 CAPSULE, EXTENDED RELEASE ORAL at 18:04

## 2019-11-03 RX ADMIN — Medication 100 GRAM(S): at 10:50

## 2019-11-03 RX ADMIN — NICARDIPINE HYDROCHLORIDE 25 MG/HR: 30 CAPSULE, EXTENDED RELEASE ORAL at 12:21

## 2019-11-03 RX ADMIN — NICARDIPINE HYDROCHLORIDE 25 MG/HR: 30 CAPSULE, EXTENDED RELEASE ORAL at 01:38

## 2019-11-03 RX ADMIN — Medication 25 MILLIGRAM(S): at 06:40

## 2019-11-03 RX ADMIN — AMLODIPINE BESYLATE 5 MILLIGRAM(S): 2.5 TABLET ORAL at 06:40

## 2019-11-03 RX ADMIN — Medication 50 MILLIGRAM(S): at 21:49

## 2019-11-03 NOTE — PROGRESS NOTE ADULT - SUBJECTIVE AND OBJECTIVE BOX
*** INCOMPLETE ***    OVERNIGHT / INTERVAL EVENTS: As per overnight staff, there were no acute events overnight    SUBJECTIVE HPI: Patient seen and examined at bedside. Patient resting comfortably, no complaints at this time. Patient denies: fever, chills, weakness, dizziness, headaches, changes in vision, chest pain, palpitations, shortness of breath, cough, N/V, diarrhea or constipation, dysuria, LE edema. ROS otherwise negative.      VITAL SIGNS:  Vital Signs Last 24 Hrs  T(C): 37.1 (03 Nov 2019 09:00), Max: 37.1 (03 Nov 2019 09:00)  T(F): 98.7 (03 Nov 2019 09:00), Max: 98.7 (03 Nov 2019 09:00)  HR: 72 (03 Nov 2019 10:00) (55 - 110)  BP: 155/86 (02 Nov 2019 23:00) (155/86 - 248/122)  BP(mean): 107 (02 Nov 2019 23:00) (102 - 146)  RR: 14 (03 Nov 2019 10:00) (5 - 32)  SpO2: 100% (03 Nov 2019 10:00) (96% - 100%)      11-02-19 @ 08:01  -  11-03-19 @ 07:00  --------------------------------------------------------  IN: 637.5 mL / OUT: 1900 mL / NET: -1262.5 mL    11-03-19 @ 07:01  -  11-03-19 @ 10:28  --------------------------------------------------------  IN: 315 mL / OUT: 300 mL / NET: 15 mL        PHYSICAL EXAM:  General: WDWN, comfortable in bed, NAD  Neurological: AAOx3, no focal deficits  HEENT: NC/AT; EOMI, PERRL, anicteric sclera, no discharge or exudate from eyes or nose; MMM  Neck: supple, no cervical or post-auricular lymphadenopathy  Cardiovascular: +S1/S2, no murmurs/rubs/gallops, RRR  Respiratory: CTA B/L, no diminished breath sounds, no wheezes/rales/rhonchi, no increased work of breathing or accessory muscle use  Gastrointestinal: soft, NT/ND; active BSx4 quadrants  Genitourinary: no suprapubic tenderness, no CVA tenderness  Extremities: WWP; no edema, clubbing or cyanosis  Vascular: 2+ radial, DP/PT pulses B/L  Skin: no rashes  Lines/Drains:       MEDICATIONS:  MEDICATIONS  (STANDING):  amLODIPine   Tablet 5 milliGRAM(s) Oral daily  chlorhexidine 2% Cloths 1 Application(s) Topical <User Schedule>  hydrALAZINE 25 milliGRAM(s) Oral every 8 hours  influenza   Vaccine 0.5 milliLiter(s) IntraMuscular once  lisinopril 20 milliGRAM(s) Oral every 24 hours  magnesium sulfate  IVPB 1 Gram(s) IV Intermittent once  niCARdipine Infusion 5 mG/Hr (25 mL/Hr) IV Continuous <Continuous>    MEDICATIONS  (PRN):      ALLERGIES/INTOLERANCES:  Allergies    No Known Allergies    Intolerances        LABS:                        15.1   6.25  )-----------( 147      ( 03 Nov 2019 05:15 )             44.4     11-03    134<L>  |  99  |  9   ----------------------------<  102<H>  3.9   |  22  |  1.02    Ca    9.5      03 Nov 2019 05:15  Mg     1.7     11-03    TPro  8.1  /  Alb  4.2  /  TBili  0.5  /  DBili  x   /  AST  15  /  ALT  7<L>  /  AlkPhos  108  11-03    PT/INR - ( 02 Nov 2019 11:14 )   PT: 12.2 sec;   INR: 1.08          PTT - ( 02 Nov 2019 11:14 )  PTT:29.0 sec  CAPILLARY BLOOD GLUCOSE          CARDIAC MARKERS ( 02 Nov 2019 15:05 )  x     / <0.01 ng/mL / x     / x     / x      CARDIAC MARKERS ( 02 Nov 2019 11:14 )  x     / <0.01 ng/mL / 112 U/L / x     / 1.8 ng/mL                Microbiology:      RADIOLOGY, EKG AND ADDITIONAL TESTS: Reviewed. OVERNIGHT / INTERVAL EVENTS: As per overnight staff, there were no acute events overnight    SUBJECTIVE HPI: Patient seen and examined at bedside. Patient resting comfortably, no complaints at this time. He denies headaches, changes in vision, chest pain, SOB, abdominal pain, N/V.     VITAL SIGNS:  Vital Signs Last 24 Hrs  T(C): 37.1 (03 Nov 2019 09:00), Max: 37.1 (03 Nov 2019 09:00)  T(F): 98.7 (03 Nov 2019 09:00), Max: 98.7 (03 Nov 2019 09:00)  HR: 72 (03 Nov 2019 10:00) (55 - 110)  BP: 155/86 (02 Nov 2019 23:00) (155/86 - 248/122)  BP(mean): 107 (02 Nov 2019 23:00) (102 - 146)  RR: 14 (03 Nov 2019 10:00) (5 - 32)  SpO2: 100% (03 Nov 2019 10:00) (96% - 100%)      11-02-19 @ 08:01  -  11-03-19 @ 07:00  --------------------------------------------------------  IN: 637.5 mL / OUT: 1900 mL / NET: -1262.5 mL    11-03-19 @ 07:01  -  11-03-19 @ 10:28  --------------------------------------------------------  IN: 315 mL / OUT: 300 mL / NET: 15 mL      PHYSICAL EXAM:  General: WDWN, comfortable in bed, NAD  Neurological: AAOx3, no focal deficits  HEENT: NC/AT; EOMI, anicteric sclera, no discharge or exudate from eyes or nose; MMM, poor dentition  Neck: supple  Cardiovascular: +S1/S2, no murmurs/rubs/gallops, RRR  Respiratory: CTA B/L, no diminished breath sounds, no wheezes/rales/rhonchi  Gastrointestinal: soft, NT/ND; active BSx4 quadrants  Genitourinary: no suprapubic tenderness  Extremities: WWP; no edema, clubbing or cyanosis  Vascular: 2+ radial, DP/PT pulses B/L  Skin: no rashes  Lines/Drains: L arterial line      MEDICATIONS:  MEDICATIONS  (STANDING):  amLODIPine   Tablet 5 milliGRAM(s) Oral daily  chlorhexidine 2% Cloths 1 Application(s) Topical <User Schedule>  hydrALAZINE 25 milliGRAM(s) Oral every 8 hours  influenza   Vaccine 0.5 milliLiter(s) IntraMuscular once  lisinopril 20 milliGRAM(s) Oral every 24 hours  magnesium sulfate  IVPB 1 Gram(s) IV Intermittent once  niCARdipine Infusion 5 mG/Hr (25 mL/Hr) IV Continuous <Continuous>      ALLERGIES/INTOLERANCES:  Allergies: No Known Allergies      LABS:                        15.1   6.25  )-----------( 147      ( 03 Nov 2019 05:15 )             44.4     11-03    134<L>  |  99  |  9   ----------------------------<  102<H>  3.9   |  22  |  1.02    Ca    9.5      03 Nov 2019 05:15  Mg     1.7     11-03    TPro  8.1  /  Alb  4.2  /  TBili  0.5  /  DBili  x   /  AST  15  /  ALT  7<L>  /  AlkPhos  108  11-03    PT/INR - ( 02 Nov 2019 11:14 )   PT: 12.2 sec;   INR: 1.08     PTT - ( 02 Nov 2019 11:14 )  PTT:29.0 sec      CARDIAC MARKERS ( 02 Nov 2019 15:05 )  x     / <0.01 ng/mL / x     / x     / x      CARDIAC MARKERS ( 02 Nov 2019 11:14 )  x     / <0.01 ng/mL / 112 U/L / x     / 1.8 ng/mL      RADIOLOGY, EKG AND ADDITIONAL TESTS: Reviewed.

## 2019-11-03 NOTE — PROVIDER CONTACT NOTE (CHANGE IN STATUS NOTIFICATION) - SITUATION
BP not decreased after additional dose hydralazine. Cardene drip increased. Now at 10mg/hr. BP still running in the 180-190s systolically.

## 2019-11-03 NOTE — PROVIDER CONTACT NOTE (CHANGE IN STATUS NOTIFICATION) - RECOMMENDATIONS
EKG recommended. Concerns expressed about GI upset being a possible sign of a cardiac issue given pt had systolic BP in the 200s for the past hour despite max dose of cardene drip.

## 2019-11-03 NOTE — PROVIDER CONTACT NOTE (CHANGE IN STATUS NOTIFICATION) - ASSESSMENT
Asymptomatic
Pt is diaphoretic and states that the vomiting came on suddenly. Does not feel dizzy. Thinks the soup bothered his stomach
14-Dec-2017

## 2019-11-03 NOTE — PROVIDER CONTACT NOTE (CHANGE IN STATUS NOTIFICATION) - ACTION/TREATMENT ORDERED:
Will re-evaluate patient's PO BP med regimen
MD called into room to assess pt. Will reassess BP once pt in bed, cannot read arterial waveform right now. Continue to monitor patient closely.

## 2019-11-04 DIAGNOSIS — D69.6 THROMBOCYTOPENIA, UNSPECIFIED: ICD-10-CM

## 2019-11-04 DIAGNOSIS — D64.9 ANEMIA, UNSPECIFIED: ICD-10-CM

## 2019-11-04 DIAGNOSIS — I51.7 CARDIOMEGALY: ICD-10-CM

## 2019-11-04 DIAGNOSIS — Z91.89 OTHER SPECIFIED PERSONAL RISK FACTORS, NOT ELSEWHERE CLASSIFIED: ICD-10-CM

## 2019-11-04 DIAGNOSIS — I16.1 HYPERTENSIVE EMERGENCY: ICD-10-CM

## 2019-11-04 DIAGNOSIS — N17.9 ACUTE KIDNEY FAILURE, UNSPECIFIED: ICD-10-CM

## 2019-11-04 DIAGNOSIS — R63.8 OTHER SYMPTOMS AND SIGNS CONCERNING FOOD AND FLUID INTAKE: ICD-10-CM

## 2019-11-04 LAB
ANION GAP SERPL CALC-SCNC: 11 MMOL/L — SIGNIFICANT CHANGE UP (ref 5–17)
BLD GP AB SCN SERPL QL: NEGATIVE — SIGNIFICANT CHANGE UP
BUN SERPL-MCNC: 17 MG/DL — SIGNIFICANT CHANGE UP (ref 7–23)
CALCIUM SERPL-MCNC: 9 MG/DL — SIGNIFICANT CHANGE UP (ref 8.4–10.5)
CHLORIDE SERPL-SCNC: 99 MMOL/L — SIGNIFICANT CHANGE UP (ref 96–108)
CO2 SERPL-SCNC: 22 MMOL/L — SIGNIFICANT CHANGE UP (ref 22–31)
CREAT SERPL-MCNC: 1.06 MG/DL — SIGNIFICANT CHANGE UP (ref 0.5–1.3)
GLUCOSE SERPL-MCNC: 104 MG/DL — HIGH (ref 70–99)
HCT VFR BLD CALC: 44 % — SIGNIFICANT CHANGE UP (ref 39–50)
HGB BLD-MCNC: 14.6 G/DL — SIGNIFICANT CHANGE UP (ref 13–17)
MAGNESIUM SERPL-MCNC: 2 MG/DL — SIGNIFICANT CHANGE UP (ref 1.6–2.6)
MCHC RBC-ENTMCNC: 30.9 PG — SIGNIFICANT CHANGE UP (ref 27–34)
MCHC RBC-ENTMCNC: 33.2 GM/DL — SIGNIFICANT CHANGE UP (ref 32–36)
MCV RBC AUTO: 93.2 FL — SIGNIFICANT CHANGE UP (ref 80–100)
NRBC # BLD: 0 /100 WBCS — SIGNIFICANT CHANGE UP (ref 0–0)
PHOSPHATE SERPL-MCNC: 2.7 MG/DL — SIGNIFICANT CHANGE UP (ref 2.5–4.5)
PLATELET # BLD AUTO: 167 K/UL — SIGNIFICANT CHANGE UP (ref 150–400)
POTASSIUM SERPL-MCNC: 4.4 MMOL/L — SIGNIFICANT CHANGE UP (ref 3.5–5.3)
POTASSIUM SERPL-SCNC: 4.4 MMOL/L — SIGNIFICANT CHANGE UP (ref 3.5–5.3)
RBC # BLD: 4.72 M/UL — SIGNIFICANT CHANGE UP (ref 4.2–5.8)
RBC # FLD: 12.6 % — SIGNIFICANT CHANGE UP (ref 10.3–14.5)
RH IG SCN BLD-IMP: POSITIVE — SIGNIFICANT CHANGE UP
SODIUM SERPL-SCNC: 132 MMOL/L — LOW (ref 135–145)
WBC # BLD: 8.15 K/UL — SIGNIFICANT CHANGE UP (ref 3.8–10.5)
WBC # FLD AUTO: 8.15 K/UL — SIGNIFICANT CHANGE UP (ref 3.8–10.5)

## 2019-11-04 PROCEDURE — 93010 ELECTROCARDIOGRAM REPORT: CPT

## 2019-11-04 PROCEDURE — 99291 CRITICAL CARE FIRST HOUR: CPT

## 2019-11-04 PROCEDURE — 71045 X-RAY EXAM CHEST 1 VIEW: CPT | Mod: 26

## 2019-11-04 RX ORDER — AMLODIPINE BESYLATE 2.5 MG/1
10 TABLET ORAL DAILY
Refills: 0 | Status: DISCONTINUED | OUTPATIENT
Start: 2019-11-05 | End: 2019-11-05

## 2019-11-04 RX ORDER — ENOXAPARIN SODIUM 100 MG/ML
40 INJECTION SUBCUTANEOUS EVERY 24 HOURS
Refills: 0 | Status: DISCONTINUED | OUTPATIENT
Start: 2019-11-05 | End: 2019-11-05

## 2019-11-04 RX ORDER — SODIUM CHLORIDE 9 MG/ML
500 INJECTION INTRAMUSCULAR; INTRAVENOUS; SUBCUTANEOUS ONCE
Refills: 0 | Status: COMPLETED | OUTPATIENT
Start: 2019-11-04 | End: 2019-11-04

## 2019-11-04 RX ORDER — AMLODIPINE BESYLATE 2.5 MG/1
5 TABLET ORAL ONCE
Refills: 0 | Status: COMPLETED | OUTPATIENT
Start: 2019-11-04 | End: 2019-11-04

## 2019-11-04 RX ORDER — CHLORTHALIDONE 50 MG
12.5 TABLET ORAL DAILY
Refills: 0 | Status: DISCONTINUED | OUTPATIENT
Start: 2019-11-04 | End: 2019-11-05

## 2019-11-04 RX ADMIN — SODIUM CHLORIDE 1000 MILLILITER(S): 9 INJECTION INTRAMUSCULAR; INTRAVENOUS; SUBCUTANEOUS at 03:26

## 2019-11-04 RX ADMIN — Medication 12.5 MILLIGRAM(S): at 20:26

## 2019-11-04 RX ADMIN — AMLODIPINE BESYLATE 5 MILLIGRAM(S): 2.5 TABLET ORAL at 12:19

## 2019-11-04 RX ADMIN — Medication 50 MILLIGRAM(S): at 05:59

## 2019-11-04 RX ADMIN — LISINOPRIL 20 MILLIGRAM(S): 2.5 TABLET ORAL at 09:25

## 2019-11-04 RX ADMIN — AMLODIPINE BESYLATE 5 MILLIGRAM(S): 2.5 TABLET ORAL at 05:59

## 2019-11-04 NOTE — PROGRESS NOTE ADULT - PROBLEM SELECTOR PLAN 2
#Left Ventricular Hypertrophy  -EKG showing LVH, likely 2/2 chronic hypertension.  -pt asymptomatic, no CP, SOB  -daily EKGs, monitor on telemetry

## 2019-11-04 NOTE — PROGRESS NOTE ADULT - SUBJECTIVE AND OBJECTIVE BOX
Transfer Acceptance Note from [facility] to [facility]    HOSPITAL COURSE  53 y/o man with no known PMHx and has not seen a physician in 10 years, who presents to ED for HTN and abnormal EKG. Admitted to CCU for management of HTN emergency sBP>200s with elevated Cr (1.43). EKG showing LVH. Started on hydralazine, amlodipine, lisinopril, and nicardipine drip for BP control.  Pt remains asymptomatic, renal function improved with blood pressure control. sBP now in the 120s-140s. Nicardipine gtt and hydralazine discontinued. Amlodipine increased to 10mg po qd. Patient hemodynamically stable for stepdown for further monitoring.    OVERNIGHT EVENTS:  Nicardipine gtt decreased from 7 to 5, and held due to sBP dropping to 140s. 500cc IV bolus given for sBP in the 120s-130s. Pt asymptomatic during the night.      INTERVAL HPI/OVERNIGHT EVENTS:  Patient was seen and examined at bedside. As per nurse and patient, no o/n events, patient resting comfortably. No complaints at this time. Patient denies: fever, chills, dizziness, weakness, HA, Changes in vision, CP, palpitations, SOB, cough, N/V/D/C, dysuria, changes in bowel movements, LE edema. ROS otherwise negative.    VITAL SIGNS:  T(F): 98.6 (11-04-19 @ 16:25)  HR: 92 (11-04-19 @ 17:00)  BP: 183/104 (11-04-19 @ 17:00)  RR: 25 (11-04-19 @ 17:00)  SpO2: 98% (11-04-19 @ 17:00)  Wt(kg): --      11-03-19 @ 07:01  -  11-04-19 @ 07:00  --------------------------------------------------------  IN: 1830 mL / OUT: 1200 mL / NET: 630 mL    11-04-19 @ 07:01  -  11-04-19 @ 18:13  --------------------------------------------------------  IN: 0 mL / OUT: 300 mL / NET: -300 mL        PHYSICAL EXAM:    Constitutional: WDWN resting comfortably in bed; NAD  HEENT: NC/AT, PERRL, EOMI, anicteric sclera, no nasal discharge; uvula midline, no oropharyngeal erythema or exudates; MMM  Neck: supple; no JVD or thyromegaly  Respiratory: CTA B/L; no W/R/R, no retractions  Cardiac: +S1/S2; RRR; no M/R/G; PMI non-displaced  Gastrointestinal: soft, NT/ND; no rebound or guarding; +BSx4  Genitourinary: normal external genitalia  Back: spine midline, no bony tenderness or step-offs; no CVAT B/L  Extremities: WWP, no clubbing or cyanosis; no peripheral edema  Musculoskeletal: NROM x4; no joint swelling, tenderness or erythema  Vascular: 2+ radial, DP/PT pulses B/L  Dermatologic: skin warm, dry and intact; no rashes, wounds, or scars  Lymphatic: no submandibular or cervical LAD  Neurologic: AAOx3; CNII-XII grossly intact; no focal deficits  Psychiatric: affect and characteristics of appearance, verbalizations, behaviors are appropriate, denies SI/HI/AH/VH    MEDICATIONS  (STANDING):  chlorhexidine 2% Cloths 1 Application(s) Topical <User Schedule>  chlorthalidone 12.5 milliGRAM(s) Oral daily  influenza   Vaccine 0.5 milliLiter(s) IntraMuscular once  lisinopril 20 milliGRAM(s) Oral every 24 hours    MEDICATIONS  (PRN):      Allergies    No Known Allergies    Intolerances        LABS:                        14.6   8.15  )-----------( 167      ( 04 Nov 2019 05:35 )             44.0     11-04    132<L>  |  99  |  17  ----------------------------<  104<H>  4.4   |  22  |  1.06    Ca    9.0      04 Nov 2019 05:35  Phos  2.7     11-04  Mg     2.0     11-04    TPro  8.1  /  Alb  4.2  /  TBili  0.5  /  DBili  x   /  AST  15  /  ALT  7<L>  /  AlkPhos  108  11-03          RADIOLOGY & ADDITIONAL TESTS:  Reviewed Transfer Acceptance Note from [facility] to [facility]    HOSPITAL COURSE  55 y/o man with no known PMHx and has not seen a physician in 10 years, who presents to ED for HTN and abnormal EKG. Admitted to CCU for management of HTN emergency sBP>200s with elevated Cr (1.43). EKG showing LVH. Started on hydralazine, amlodipine, lisinopril, and nicardipine drip for BP control.  Pt remains asymptomatic, renal function improved with blood pressure control. sBP now in the 120s-140s. Nicardipine gtt and hydralazine discontinued. Amlodipine increased to 10mg po qd. Patient hemodynamically stable for stepdown for further monitoring.    OVERNIGHT EVENTS:  Nicardipine gtt decreased from 7 to 5, and held due to sBP dropping to 140s. 500cc IV bolus given for sBP in the 120s-130s. Pt asymptomatic during the night.      INTERVAL HPI:  Patient was seen and examined at bedside. As per nurse and patient, no o/n events, patient resting comfortably. No complaints at this time. Patient denies: fever, chills, dizziness, weakness, HA, Changes in vision, CP, palpitations, SOB, cough, N/V/D/C, dysuria, changes in bowel movements, LE edema. ROS otherwise negative.    VITAL SIGNS:  T(F): 98.6 (11-04-19 @ 16:25)  HR: 92 (11-04-19 @ 17:00)  BP: 183/104 (11-04-19 @ 17:00)  RR: 25 (11-04-19 @ 17:00)  SpO2: 98% (11-04-19 @ 17:00)  Wt(kg): --      11-03-19 @ 07:01  -  11-04-19 @ 07:00  --------------------------------------------------------  IN: 1830 mL / OUT: 1200 mL / NET: 630 mL    11-04-19 @ 07:01  -  11-04-19 @ 18:13  --------------------------------------------------------  IN: 0 mL / OUT: 300 mL / NET: -300 mL        PHYSICAL EXAM:    Constitutional: WDWN resting comfortably in bed; NAD  HEENT: NC/AT, PERRL, EOMI, anicteric sclera, no nasal discharge; uvula midline, no oropharyngeal erythema or exudates; MMM  Neck: supple; no JVD or thyromegaly  Respiratory: CTA B/L; no W/R/R, no retractions  Cardiac: +S1/S2; RRR; no M/R/G; PMI non-displaced  Gastrointestinal: soft, NT/ND; no rebound or guarding; +BSx4  Genitourinary: normal external genitalia  Back: spine midline, no bony tenderness or step-offs; no CVAT B/L  Extremities: WWP, no clubbing or cyanosis; no peripheral edema  Musculoskeletal: NROM x4; no joint swelling, tenderness or erythema  Vascular: 2+ radial, DP/PT pulses B/L  Dermatologic: skin warm, dry and intact; no rashes, wounds, or scars  Lymphatic: no submandibular or cervical LAD  Neurologic: AAOx3; CNII-XII grossly intact; no focal deficits  Psychiatric: affect and characteristics of appearance, verbalizations, behaviors are appropriate, denies SI/HI/AH/VH    MEDICATIONS  (STANDING):  chlorhexidine 2% Cloths 1 Application(s) Topical <User Schedule>  chlorthalidone 12.5 milliGRAM(s) Oral daily  influenza   Vaccine 0.5 milliLiter(s) IntraMuscular once  lisinopril 20 milliGRAM(s) Oral every 24 hours    MEDICATIONS  (PRN):      Allergies    No Known Allergies    Intolerances        LABS:                        14.6   8.15  )-----------( 167      ( 04 Nov 2019 05:35 )             44.0     11-04    132<L>  |  99  |  17  ----------------------------<  104<H>  4.4   |  22  |  1.06    Ca    9.0      04 Nov 2019 05:35  Phos  2.7     11-04  Mg     2.0     11-04    TPro  8.1  /  Alb  4.2  /  TBili  0.5  /  DBili  x   /  AST  15  /  ALT  7<L>  /  AlkPhos  108  11-03          RADIOLOGY & ADDITIONAL TESTS:  Reviewed Transfer Acceptance Note from [facility] to [facility]    HOSPITAL COURSE  55 y/o man with no known PMHx and has not seen a physician in 10 years, who presents to ED for HTN and abnormal EKG. Admitted to CCU for management of HTN emergency sBP>200s with elevated Cr (1.43). EKG showing LVH. Started on hydralazine, amlodipine, lisinopril, and nicardipine drip for BP control.  Pt remains asymptomatic, renal function improved with blood pressure control. sBP now in the 120s-140s. Nicardipine gtt and hydralazine discontinued. Amlodipine increased to 10mg po qd. Patient hemodynamically stable for stepdown for further monitoring.    OVERNIGHT EVENTS:  Nicardipine gtt decreased from 7 to 5, and held due to sBP dropping to 140s. 500cc IV bolus given for sBP in the 120s-130s. Pt asymptomatic during the night.      INTERVAL HPI:  Patient was seen and examined at bedside. As per nurse and patient, no o/n events, patient resting comfortably. No complaints at this time. Patient denies: fever, chills, dizziness, weakness, HA, Changes in vision, CP, palpitations, SOB, cough, N/V/D/C, dysuria, changes in bowel movements, LE edema. ROS otherwise negative.    VITAL SIGNS:  T(F): 98.6 (11-04-19 @ 16:25)  HR: 92 (11-04-19 @ 17:00)  BP: 183/104 (11-04-19 @ 17:00)  RR: 25 (11-04-19 @ 17:00)  SpO2: 98% (11-04-19 @ 17:00)  Wt(kg): --      11-03-19 @ 07:01  -  11-04-19 @ 07:00  --------------------------------------------------------  IN: 1830 mL / OUT: 1200 mL / NET: 630 mL    11-04-19 @ 07:01  -  11-04-19 @ 18:13  --------------------------------------------------------  IN: 0 mL / OUT: 300 mL / NET: -300 mL        PHYSICAL EXAM:    Constitutional: WDWN resting comfortably in bed; NAD  HEENT: NC/AT, PERRL, EOMI, anicteric sclera, no nasal discharge; uvula midline, no oropharyngeal erythema or exudates; MMM  Neck: supple; no JVD or thyromegaly  Respiratory: CTA B/L; no W/R/R, no retractions  Cardiac: +S1/S2; RRR; no M/R/G; PMI non-displaced  Gastrointestinal: soft, NT/ND; no rebound or guarding; +BSx4  Extremities: WWP, no clubbing or cyanosis; no peripheral edema  Musculoskeletal: NROM x4; no joint swelling, tenderness or erythema  Vascular: 2+ radial, DP/PT pulses B/L  Dermatologic: skin warm, dry and intact; no rashes, wounds, or scars  Lymphatic: no submandibular or cervical LAD  Neurologic: AAOx3; CNII-XII grossly intact; no focal deficits    MEDICATIONS  (STANDING):  chlorhexidine 2% Cloths 1 Application(s) Topical <User Schedule>  chlorthalidone 12.5 milliGRAM(s) Oral daily  influenza   Vaccine 0.5 milliLiter(s) IntraMuscular once  lisinopril 20 milliGRAM(s) Oral every 24 hours    MEDICATIONS  (PRN):      Allergies    No Known Allergies    Intolerances        LABS:                        14.6   8.15  )-----------( 167      ( 04 Nov 2019 05:35 )             44.0     11-04    132<L>  |  99  |  17  ----------------------------<  104<H>  4.4   |  22  |  1.06    Ca    9.0      04 Nov 2019 05:35  Phos  2.7     11-04  Mg     2.0     11-04    TPro  8.1  /  Alb  4.2  /  TBili  0.5  /  DBili  x   /  AST  15  /  ALT  7<L>  /  AlkPhos  108  11-03          RADIOLOGY & ADDITIONAL TESTS:  Reviewed

## 2019-11-04 NOTE — PROGRESS NOTE ADULT - SUBJECTIVE AND OBJECTIVE BOX
OVERNIGHT EVENTS:  Nicardipine gtt decreased from 7 to 5, and held due to sBP dropping to 140s. 500cc IV bolus given for sBP in the 120s-130s. Pt asymptomatic during the night.    SUBJECTIVE/INTERVAL HPI: Patient was seen and examined at bedside. Patient reports no new complaints. Denies any CP, SOB, headache, vision changes, difficulty with urination.    VITALS  Vital Signs Last 24 Hrs  T(C): 36.7 (04 Nov 2019 10:00), Max: 36.9 (03 Nov 2019 21:00)  T(F): 98.1 (04 Nov 2019 10:00), Max: 98.5 (03 Nov 2019 21:00)  HR: 86 (04 Nov 2019 12:00) (68 - 110)  BP: 165/94 (04 Nov 2019 12:00) (152/86 - 208/108)  BP(mean): 118 (04 Nov 2019 12:00) (104 - 165)  RR: 28 (04 Nov 2019 12:00) (9 - 28)  SpO2: 99% (04 Nov 2019 12:00) (98% - 100%)    I&O's Summary    03 Nov 2019 07:01  -  04 Nov 2019 07:00  --------------------------------------------------------  IN: 1830 mL / OUT: 1200 mL / NET: 630 mL        CAPILLARY BLOOD GLUCOSE      PHYSICAL EXAM  General: In NAD. Resting comfortably in bed.  HEENT: PERRL/ EOMI, MMM, no JVD, no thyromegaly. Trachea midline.  Respiratory: CTA b/l, no wheezes, rales or rhonchi  Cardiovascular: RRR, +S1/S2. No murmurs, rubs, or gallops.  Abdomen: Soft, NT, ND, normoactive bowel sounds, no rebound, no guarding.  Extremities: WWP. No lower extremeity edema. Equal radial, DP pulses bilaterally  Skin: No rashes  Neurological: AAOx3. CN II-XII grossly intact. No FND.    MEDICATIONS  (STANDING):  chlorhexidine 2% Cloths 1 Application(s) Topical <User Schedule>  influenza   Vaccine 0.5 milliLiter(s) IntraMuscular once  lisinopril 20 milliGRAM(s) Oral every 24 hours    MEDICATIONS  (PRN):      LABS                        14.6   8.15  )-----------( 167      ( 04 Nov 2019 05:35 )             44.0     11-04    132<L>  |  99  |  17  ----------------------------<  104<H>  4.4   |  22  |  1.06    Ca    9.0      04 Nov 2019 05:35  Phos  2.7     11-04  Mg     2.0     11-04    TPro  8.1  /  Alb  4.2  /  TBili  0.5  /  DBili  x   /  AST  15  /  ALT  7<L>  /  AlkPhos  108  11-03    LIVER FUNCTIONS - ( 03 Nov 2019 05:15 )  Alb: 4.2 g/dL / Pro: 8.1 g/dL / ALK PHOS: 108 U/L / ALT: 7 U/L / AST: 15 U/L / GGT: x               CARDIAC MARKERS ( 02 Nov 2019 15:05 )  x     / <0.01 ng/mL / x     / x     / x            Radiology and other tests: Reviewed 5EA TO 5LA TRANSFER NOTE    HOSPITAL COURSE  53 y/o man with no known PMHx and has not seen a physician in 10 years, who presents to ED for HTN and abnormal EKG. Admitted to CCU for management of HTN emergency sBP>200s with elevated Cr (1.43). EKG showing LVH. Started on hydralazine, amlodipine, lisinopril, and nicardipine drip for BP control.  Pt remains asymptomatic, renal function improved with blood pressure control. sBP now in the 120s-140s. Nicardipine gtt and hydralazine discontinued. Amlodipine increased to 10mg po qd. Patient hemodynamically stable for stepdown for further monitoring.    OVERNIGHT EVENTS:  Nicardipine gtt decreased from 7 to 5, and held due to sBP dropping to 140s. 500cc IV bolus given for sBP in the 120s-130s. Pt asymptomatic during the night.    SUBJECTIVE/INTERVAL HPI: Patient was seen and examined at bedside. Patient reports no new complaints. Denies any CP, SOB, headache, vision changes, difficulty with urination.    VITALS  Vital Signs Last 24 Hrs  T(C): 36.7 (04 Nov 2019 10:00), Max: 36.9 (03 Nov 2019 21:00)  T(F): 98.1 (04 Nov 2019 10:00), Max: 98.5 (03 Nov 2019 21:00)  HR: 86 (04 Nov 2019 12:00) (68 - 110)  BP: 165/94 (04 Nov 2019 12:00) (152/86 - 208/108)  BP(mean): 118 (04 Nov 2019 12:00) (104 - 165)  RR: 28 (04 Nov 2019 12:00) (9 - 28)  SpO2: 99% (04 Nov 2019 12:00) (98% - 100%)    I&O's Summary    03 Nov 2019 07:01  -  04 Nov 2019 07:00  --------------------------------------------------------  IN: 1830 mL / OUT: 1200 mL / NET: 630 mL        CAPILLARY BLOOD GLUCOSE      PHYSICAL EXAM  General: In NAD. Resting comfortably in bed.  HEENT: PERRL/ EOMI, MMM, no JVD, no thyromegaly. Trachea midline.  Respiratory: CTA b/l, no wheezes, rales or rhonchi  Cardiovascular: RRR, +S1/S2. No murmurs, rubs, or gallops.  Abdomen: Soft, NT, ND, normoactive bowel sounds, no rebound, no guarding.  Extremities: WWP. No lower extremeity edema. Equal radial, DP pulses bilaterally  Skin: No rashes  Neurological: AAOx3. CN II-XII grossly intact. No FND.    MEDICATIONS  (STANDING):  chlorhexidine 2% Cloths 1 Application(s) Topical <User Schedule>  influenza   Vaccine 0.5 milliLiter(s) IntraMuscular once  lisinopril 20 milliGRAM(s) Oral every 24 hours    MEDICATIONS  (PRN):      LABS                        14.6   8.15  )-----------( 167      ( 04 Nov 2019 05:35 )             44.0     11-04    132<L>  |  99  |  17  ----------------------------<  104<H>  4.4   |  22  |  1.06    Ca    9.0      04 Nov 2019 05:35  Phos  2.7     11-04  Mg     2.0     11-04    TPro  8.1  /  Alb  4.2  /  TBili  0.5  /  DBili  x   /  AST  15  /  ALT  7<L>  /  AlkPhos  108  11-03    LIVER FUNCTIONS - ( 03 Nov 2019 05:15 )  Alb: 4.2 g/dL / Pro: 8.1 g/dL / ALK PHOS: 108 U/L / ALT: 7 U/L / AST: 15 U/L / GGT: x               CARDIAC MARKERS ( 02 Nov 2019 15:05 )  x     / <0.01 ng/mL / x     / x     / x            Radiology and other tests: Reviewed

## 2019-11-04 NOTE — PROGRESS NOTE ADULT - PROBLEM SELECTOR PLAN 7
Fluids: s/p 1L IVF, 100cc/hr LR  Electrolytes: Mg>2, K>4  Nutrition:  No IVF currently needed, replete lytes PRN, NPO  Prophylaxis: Heparin subQ 5000units q8, IPC  Activity: AAT  GI: none  Dispo: Admit to F Fluids: s/p 1L IVF, 100cc/hr LR  Electrolytes: Mg>2, K>4  Nutrition:  No IVF currently needed, replete lytes PRN, NPO  Prophylaxis: Lovenox, IPC  Activity: AAT  GI: none  Dispo: Admit to F

## 2019-11-04 NOTE — PROGRESS NOTE ADULT - ATTENDING COMMENTS
Pt is a 55 y/o man who presents with HTN emergency for evaluation.    afeb 136/90, HR 74, 98%  I/O -1.2L    Hgb 14.6  Plt 167  Cr 1.06    ECG: nsr c LVH    - pt controlled on meds  - would d/c hydral and give lisinopril and amlodipine  - pt needs outpt f/u  - cardene gtt d/c'd  - remove a-line today

## 2019-11-04 NOTE — PROGRESS NOTE ADULT - PROBLEM SELECTOR PLAN 4
#Thrombocytopenia  - Pt 132 -> 147 -> 167, stable  - no signs of bleeding  - continue to monitor with CBC

## 2019-11-04 NOTE — PROGRESS NOTE ADULT - PROBLEM SELECTOR PLAN 5
#DANAY - RESOLVED  - Pt with Cr 1.43, BUN 9; baseline unknown as pt has not seen a doctor in the past 10 years  - pt reports no urinary complaints  - BMP 11/4 showing Cr wnl (1.06) -- etiology of DANAY on admission likely 2/2 HTN, Cr improved with blood pressure control.

## 2019-11-04 NOTE — PROGRESS NOTE ADULT - ASSESSMENT
55 y/o man with no known PMHx and has not seen a physician in 10 years, who presents to ED for HTN and abnormal EKG. Admitted to CCU for management of HTN emergency sBP>200s with elevated Cr (1.43). EKG showing LVH. Pt remains asymptomatic, renal function improved with blood pressure control now stepped down to 5la for further management.
53 y/o man with no known PMHx and has not seen a physician in 10 years, who presents to ED for HTN and abnormal EKG. Admitted to CCU for management of HTN emergency sBP>200s with elevated Cr (1.43). EKG showing LVH. Pt remains asymptomatic, renal function improved with blood pressure control.    CARDIOVASCULAR  #HTN emergency  Pt presented with sBP persistently >200s and asymptomatic (no CP, SOB, headaches, difficulty with urination). Pt has not seen a physician in ~10yrs, HTN likely chronic. Cr on admission 1.43, indicating kidney damage likely 2/2 HTN.  - s/p labetalol 10mg IVP x2, IV hydralazine 10mg x1, 25mg PO hydralazine x1  - Initially started on hydral 25mg PO q8hrs, amlodipine 5mg PO QD, then added nicardipine gtt at 5cc/hr due to persistent SBP >200 despite PO regimen  - hydralazine increased to 50mg PO q8hrs  - started on lisinopril 20mg QD  - nicardipine gtt held since overnight given sBP 120s-140s  Plan:  - increase amlodipine from 5 to 10mg qd  - c/w lisinopril 20mg qd  - discontinue hydralazine po 50mg TID  - monitor BPs on oral regimen   - remove A-line  - will need outpatient follow-up for a home medication regimen on discharge    #Left Ventricular Hypertrophy  -EKG showing LVH, likely 2/2 chronic hypertension.  -pt asymptomatic, no CP, SOB  -daily EKGs, monitor on telemetry    HEME/ONC  #Anemia - RESOLVED  - Pt no known hx of anemia, here with Hb 12.9, MCV 96.1  - Hb today stable  - no signs of bleeding  - continue to monitor with CBC    #Thrombocytopenia  - Pt 132 -> 147 -> 167, stable  - no signs of bleeding  - continue to monitor with CBC    RENAL  #DANAY - RESOLVED  - Pt with Cr 1.43, BUN 9; baseline unknown as pt has not seen a doctor in the past 10 years  - pt reports no urinary complaints  - BMP 11/4 showing Cr wnl (1.06) -- etiology of DANAY on admission likely 2/2 HTN, Cr improved with blood pressure control.    PULMONARY  No acute issues    NEUROLOGY  No acute issues    ENDOCRINE  No acute issues    MUSCULOSKELETAL  No acute issues      F: none  E: replete K<4, Mg<2  N: DASH/TLC  VTE Prophylaxis: SCDs  C: Full Code  D: CCU
55 y/o man with no known PMHx and has not seen a physician in 10 years, who presents to ED for HTN and abnormal EKG. Admitted to CCU for management of HTN emergency sBP>200s with elevated Cr (1.43). EKG showing LVH. Pt remains asymptomatic, renal function improved with blood pressure control.    CARDIOVASCULAR  #HTN emergency  Pt presented with sBP persistently >200s and asymptomatic (no CP, SOB, headaches, difficulty with urination). Pt has not seen a physician in ~10yrs, HTN likely chronic. Cr on admission 1.43, indicating kidney damage likely 2/2 HTN.  - s/p labetalol 10mg IVP x2, IV hydralazine 10mg x1, 25mg PO hydralazine x1  - Initially started on hydral 25mg PO q8hrs, amlodipine 5mg PO QD, then added nicardipine gtt at 5cc/hr due to persistent SBP >200 despite PO regimen  - hydralazine increased to 50mg PO q8hrs  - started on lisinopril 20mg QD  Plan:    -wean off nicardipine gtt (goal SBP<180)    -c/w hydralazine 50mg PO q8hrs, lisinopril 20mg QD    -If patient persistently hypertensive, consider adding chlorthalidone    #Left Ventricular Hypertrophy  -EKG showing LVH, likely 2/2 chronic hypertension.  -pt asymptomatic, no CP, SOB  -daily EKGs, monitor on telemetry    HEME/ONC  #Anemia - RESOLVED  - Pt no known hx of anemia, here with Hb 12.9, MCV 96.1  - CBC 11/3 w/ Hb wnl (~15)  - no signs of bleeding  - continue to monitor with CBC    #Thrombocytopenia  - Pt 132 -> 147  - no signs of bleeding  - continue to monitor with CBC    RENAL  #DANAY - RESOLVED  - Pt with Cr 1.43, BUN 9; baseline unknown as pt has not seen a doctor in the past 10 years  - pt reports no urinary complaints  - BMP 11/3 showing Cr wnl (1.02) -- etiology of DANAY on admission likely 2/2 HTN, Cr improved with blood pressure control.    PULMONARY  No acute issues    NEUROLOGY  No acute issues    ENDOCRINE  No acute issues    MUSCULOSKELETAL  No acute issues      F: none  E: replete K<4, Mg<2  N: DASH/TLC  VTE Prophylaxis: hep subQ, SCDs  C: Full Code  D: CCU

## 2019-11-04 NOTE — PROGRESS NOTE ADULT - PROBLEM SELECTOR PLAN 3
#Anemia - RESOLVED  - Pt no known hx of anemia, here with Hb 12.9, MCV 96.1  - Hb today stable  - no signs of bleeding  - continue to monitor with CB

## 2019-11-04 NOTE — PROGRESS NOTE ADULT - PROBLEM SELECTOR PLAN 1
Pt presented with sBP persistently >200s and asymptomatic (no CP, SOB, headaches, difficulty with urination). Pt has not seen a physician in ~10yrs, HTN likely chronic. Cr on admission 1.43, indicating kidney damage likely 2/2 HTN.  - s/p labetalol 10mg IVP x2, IV hydralazine 10mg x1, 25mg PO hydralazine x1  - Initially started on hydral 25mg PO q8hrs, amlodipine 5mg PO QD, then added nicardipine gtt at 5cc/hr due to persistent SBP >200 despite PO regimen  -nicardipine gtt d/c with A-line bp 120's/90s with poor NIBP correlation (160s/80s)  -c/w lisinopril 20mg qd, norvasc 10mg qd, HCTZ 12.5mg qd

## 2019-11-05 ENCOUNTER — TRANSCRIPTION ENCOUNTER (OUTPATIENT)
Age: 54
End: 2019-11-05

## 2019-11-05 VITALS — TEMPERATURE: 99 F

## 2019-11-05 LAB
ANION GAP SERPL CALC-SCNC: 12 MMOL/L — SIGNIFICANT CHANGE UP (ref 5–17)
BUN SERPL-MCNC: 19 MG/DL — SIGNIFICANT CHANGE UP (ref 7–23)
CALCIUM SERPL-MCNC: 9.2 MG/DL — SIGNIFICANT CHANGE UP (ref 8.4–10.5)
CHLORIDE SERPL-SCNC: 100 MMOL/L — SIGNIFICANT CHANGE UP (ref 96–108)
CHOLEST SERPL-MCNC: 150 MG/DL — SIGNIFICANT CHANGE UP (ref 10–199)
CO2 SERPL-SCNC: 22 MMOL/L — SIGNIFICANT CHANGE UP (ref 22–31)
CREAT SERPL-MCNC: 1.38 MG/DL — HIGH (ref 0.5–1.3)
GLUCOSE SERPL-MCNC: 94 MG/DL — SIGNIFICANT CHANGE UP (ref 70–99)
HBA1C BLD-MCNC: 5.3 % — SIGNIFICANT CHANGE UP (ref 4–5.6)
HCT VFR BLD CALC: 44.8 % — SIGNIFICANT CHANGE UP (ref 39–50)
HDLC SERPL-MCNC: 71 MG/DL — SIGNIFICANT CHANGE UP
HGB BLD-MCNC: 14.7 G/DL — SIGNIFICANT CHANGE UP (ref 13–17)
LIPID PNL WITH DIRECT LDL SERPL: 66 MG/DL — SIGNIFICANT CHANGE UP
MAGNESIUM SERPL-MCNC: 1.9 MG/DL — SIGNIFICANT CHANGE UP (ref 1.6–2.6)
MCHC RBC-ENTMCNC: 31 PG — SIGNIFICANT CHANGE UP (ref 27–34)
MCHC RBC-ENTMCNC: 32.8 GM/DL — SIGNIFICANT CHANGE UP (ref 32–36)
MCV RBC AUTO: 94.5 FL — SIGNIFICANT CHANGE UP (ref 80–100)
NRBC # BLD: 0 /100 WBCS — SIGNIFICANT CHANGE UP (ref 0–0)
PLATELET # BLD AUTO: 161 K/UL — SIGNIFICANT CHANGE UP (ref 150–400)
POTASSIUM SERPL-MCNC: 4.4 MMOL/L — SIGNIFICANT CHANGE UP (ref 3.5–5.3)
POTASSIUM SERPL-SCNC: 4.4 MMOL/L — SIGNIFICANT CHANGE UP (ref 3.5–5.3)
RBC # BLD: 4.74 M/UL — SIGNIFICANT CHANGE UP (ref 4.2–5.8)
RBC # FLD: 12.5 % — SIGNIFICANT CHANGE UP (ref 10.3–14.5)
SODIUM SERPL-SCNC: 134 MMOL/L — LOW (ref 135–145)
TOTAL CHOLESTEROL/HDL RATIO MEASUREMENT: 2.1 RATIO — LOW (ref 3.4–9.6)
TRIGL SERPL-MCNC: 65 MG/DL — SIGNIFICANT CHANGE UP (ref 10–149)
TSH SERPL-MCNC: 1.03 UIU/ML — SIGNIFICANT CHANGE UP (ref 0.35–4.94)
WBC # BLD: 7.68 K/UL — SIGNIFICANT CHANGE UP (ref 3.8–10.5)
WBC # FLD AUTO: 7.68 K/UL — SIGNIFICANT CHANGE UP (ref 3.8–10.5)

## 2019-11-05 PROCEDURE — 85027 COMPLETE CBC AUTOMATED: CPT

## 2019-11-05 PROCEDURE — 85025 COMPLETE CBC W/AUTO DIFF WBC: CPT

## 2019-11-05 PROCEDURE — 87389 HIV-1 AG W/HIV-1&-2 AB AG IA: CPT

## 2019-11-05 PROCEDURE — 82553 CREATINE MB FRACTION: CPT

## 2019-11-05 PROCEDURE — 84484 ASSAY OF TROPONIN QUANT: CPT

## 2019-11-05 PROCEDURE — 96374 THER/PROPH/DIAG INJ IV PUSH: CPT

## 2019-11-05 PROCEDURE — 99238 HOSP IP/OBS DSCHRG MGMT 30/<: CPT

## 2019-11-05 PROCEDURE — 80061 LIPID PANEL: CPT

## 2019-11-05 PROCEDURE — 80307 DRUG TEST PRSMV CHEM ANLYZR: CPT

## 2019-11-05 PROCEDURE — 71045 X-RAY EXAM CHEST 1 VIEW: CPT

## 2019-11-05 PROCEDURE — 93306 TTE W/DOPPLER COMPLETE: CPT | Mod: 26

## 2019-11-05 PROCEDURE — 83690 ASSAY OF LIPASE: CPT

## 2019-11-05 PROCEDURE — 86850 RBC ANTIBODY SCREEN: CPT

## 2019-11-05 PROCEDURE — 86900 BLOOD TYPING SEROLOGIC ABO: CPT

## 2019-11-05 PROCEDURE — 86901 BLOOD TYPING SEROLOGIC RH(D): CPT

## 2019-11-05 PROCEDURE — 93005 ELECTROCARDIOGRAM TRACING: CPT | Mod: 76

## 2019-11-05 PROCEDURE — 84443 ASSAY THYROID STIM HORMONE: CPT

## 2019-11-05 PROCEDURE — 84100 ASSAY OF PHOSPHORUS: CPT

## 2019-11-05 PROCEDURE — 85730 THROMBOPLASTIN TIME PARTIAL: CPT

## 2019-11-05 PROCEDURE — 85610 PROTHROMBIN TIME: CPT

## 2019-11-05 PROCEDURE — 99291 CRITICAL CARE FIRST HOUR: CPT | Mod: 25

## 2019-11-05 PROCEDURE — 93306 TTE W/DOPPLER COMPLETE: CPT

## 2019-11-05 PROCEDURE — 96376 TX/PRO/DX INJ SAME DRUG ADON: CPT

## 2019-11-05 PROCEDURE — 80053 COMPREHEN METABOLIC PANEL: CPT

## 2019-11-05 PROCEDURE — 83735 ASSAY OF MAGNESIUM: CPT

## 2019-11-05 PROCEDURE — 36415 COLL VENOUS BLD VENIPUNCTURE: CPT

## 2019-11-05 PROCEDURE — 82550 ASSAY OF CK (CPK): CPT

## 2019-11-05 PROCEDURE — 83036 HEMOGLOBIN GLYCOSYLATED A1C: CPT

## 2019-11-05 PROCEDURE — 83880 ASSAY OF NATRIURETIC PEPTIDE: CPT

## 2019-11-05 PROCEDURE — 80048 BASIC METABOLIC PNL TOTAL CA: CPT

## 2019-11-05 PROCEDURE — 96375 TX/PRO/DX INJ NEW DRUG ADDON: CPT

## 2019-11-05 RX ORDER — AMLODIPINE BESYLATE 2.5 MG/1
1 TABLET ORAL
Qty: 30 | Refills: 3
Start: 2019-11-05 | End: 2020-03-03

## 2019-11-05 RX ORDER — CHLORTHALIDONE 50 MG
1 TABLET ORAL
Qty: 30 | Refills: 3
Start: 2019-11-05 | End: 2020-03-03

## 2019-11-05 RX ORDER — HYDRALAZINE HCL 50 MG
50 TABLET ORAL EVERY 8 HOURS
Refills: 0 | Status: DISCONTINUED | OUTPATIENT
Start: 2019-11-05 | End: 2019-11-05

## 2019-11-05 RX ORDER — LISINOPRIL 2.5 MG/1
1 TABLET ORAL
Qty: 30 | Refills: 3
Start: 2019-11-05 | End: 2020-03-03

## 2019-11-05 RX ORDER — HYDRALAZINE HCL 50 MG
1 TABLET ORAL
Qty: 90 | Refills: 3
Start: 2019-11-05 | End: 2020-03-03

## 2019-11-05 RX ADMIN — AMLODIPINE BESYLATE 10 MILLIGRAM(S): 2.5 TABLET ORAL at 05:57

## 2019-11-05 RX ADMIN — ENOXAPARIN SODIUM 40 MILLIGRAM(S): 100 INJECTION SUBCUTANEOUS at 05:57

## 2019-11-05 RX ADMIN — Medication 50 MILLIGRAM(S): at 10:15

## 2019-11-05 RX ADMIN — LISINOPRIL 20 MILLIGRAM(S): 2.5 TABLET ORAL at 10:15

## 2019-11-05 NOTE — DISCHARGE NOTE PROVIDER - HOSPITAL COURSE
A 55 y/o man with no known PMH and has not seen a physician in 10 years, who presents to ED for HTN and abnormal EKG. Admitted to CCU for management of HTN emergency sBP>200s with elevated Cr (1.43). EKG showing LVH. Started on hydralazine, amlodipine, lisinopril, and nicardipine drip for BP control.  Pt remains asymptomatic, renal function improved with blood pressure control. sBP now in the 120s-140s. Nicardipine gtt and hydralazine discontinued. Amlodipine increased to 10mg po qd.         BP controlled with Amlodipine 10mg daily, Chlorthalidone 12.5mg daily, Lisinopril 20mg daily. Hgb A1C and Lipid profile normal. ECHO showing            New medications:         Labs to be followed outpatient: None    Exam to be followed outpatient: None A 53 y/o man with no known PMH and has not seen a physician in 10 years, who presents to ED for HTN and abnormal EKG. Admitted to CCU for management of HTN emergency sBP>200s with elevated Cr (1.43). EKG showing LVH. Started on hydralazine, amlodipine, lisinopril, and nicardipine drip for BP control.  Pt remains asymptomatic, renal function improved with blood pressure control. sBP now in the 120s-140s. Nicardipine gtt and hydralazine discontinued. Amlodipine increased to 10mg po qd.         BP controlled with Amlodipine 10mg daily, Chlorthalidone 25mg daily, Lisinopril 20mg daily, Hydral 50 TID. Hgb A1C and Lipid profile normal.         ECHO 11/5:    CONCLUSIONS:         1. Moderate symmetric left ventricular hypertrophy.     2. Normal left and right ventricular size and systolic function.     3. Grade I left ventricular diastolic dysfunction without elevated     filling pressure.     4. No significant valvular disease.     5. PASP 27 mmHg.     6. No pericardial effusion.        New medications: Amlodipine 10mg daily, Chlorthalidone 25mg daily, Lisinopril 20mg daily, Hydralazine 50 TID.         Labs to be followed outpatient: None    Exam to be followed outpatient: None

## 2019-11-05 NOTE — DISCHARGE NOTE PROVIDER - NSDCFUADDAPPT_GEN_ALL_CORE_FT
Appointment with Dr. Chaparrita Avery on Tuesday, November 12, at 11:30pm. Please bring your insurance information to this appointment. Thank you. Appointment with Dr. Chaparrita Avery on Tuesday, November 12, at 11:30pm. Please bring your insurance information to this appointment. Thank you.    Address:  [address] [place] [address], [address]  [place] [address]

## 2019-11-05 NOTE — DISCHARGE NOTE PROVIDER - NSDCMRMEDTOKEN_GEN_ALL_CORE_FT
amLODIPine 10 mg oral tablet: 1 tab(s) orally once a day  chlorthalidone 25 mg oral tablet: 1 tab(s) orally once a day   hydrALAZINE 50 mg oral tablet: 1 tab(s) orally every 8 hours  lisinopril 20 mg oral tablet: 1 tab(s) orally every 24 hours

## 2019-11-05 NOTE — DISCHARGE NOTE PROVIDER - NSDCCPCAREPLAN_GEN_ALL_CORE_FT
PRINCIPAL DISCHARGE DIAGNOSIS  Diagnosis: Hypertensive emergency  Assessment and Plan of Treatment: You presented with elevated blood pressure above 200 but were asymptomatic. You were started on an intravenous nicardipine infusion to control your blood pressure and then switched to an oral regimen.  Please continue taking Lisinopril 20mg daily, Norvasc 10mg daily, [id number] daily. Please follow up with Dr. Epperson for further management of care.      SECONDARY DISCHARGE DIAGNOSES  Diagnosis: Acute kidney injury  Assessment and Plan of Treatment: Your creatinine was elevated on admission likely due to uncontrolled hypertension. The creatinine improved once blood pressure was controlled. Please follow up with Dr. Epperson for further management of care. PRINCIPAL DISCHARGE DIAGNOSIS  Diagnosis: Hypertensive emergency  Assessment and Plan of Treatment: You presented with elevated blood pressure above 200 but were asymptomatic. You were started on an intravenous nicardipine infusion to control your blood pressure and then switched to an oral regimen.  Please continue taking Lisinopril 20mg daily, Norvasc 10mg daily, Chlorthalidone 25mg daily and Hydralazine 50mg every 8 hours. Please follow up with Dr. Avery for further management of care.      SECONDARY DISCHARGE DIAGNOSES  Diagnosis: Acute kidney injury  Assessment and Plan of Treatment: Your creatinine was elevated on admission likely due to uncontrolled hypertension. The creatinine improved once blood pressure was controlled. Please follow up with Dr. Avery for further management of care.

## 2019-11-05 NOTE — DISCHARGE NOTE PROVIDER - CARE PROVIDER_API CALL
Chaparrita Avery)  Cardiovascular Disease; Internal Medicine  [address] [id number]  Phone: [phone]  Fax: [phone]  Follow Up Time: 2 weeks

## 2019-11-05 NOTE — DISCHARGE NOTE NURSING/CASE MANAGEMENT/SOCIAL WORK - PATIENT PORTAL LINK FT
You can access the FollowMyHealth Patient Portal offered by [facility] by registering at the following website: [email or web address]. By joining [employer]’s FollowMyHealth portal, you will also be able to view your health information using other applications (apps) compatible with our system.

## 2019-11-05 NOTE — DISCHARGE NOTE NURSING/CASE MANAGEMENT/SOCIAL WORK - NSDCFUADDAPPT_GEN_ALL_CORE_FT
Appointment with Dr. Chaparrita Avery on Tuesday, November 12, at 11:30pm. Please bring your insurance information to this appointment. Thank you.    Address:  [address] [place] [address], [address]  [place] [address]

## 2019-11-05 NOTE — DISCHARGE NOTE PROVIDER - NSDCCPTREATMENT_GEN_ALL_CORE_FT
PRINCIPAL PROCEDURE  Procedure: Echo 2D  Findings and Treatment: CONCLUSIONS:   1. Moderate symmetric left ventricular hypertrophy.   2. Normal left and right ventricular size and systolic function.   3. Grade I left ventricular diastolic dysfunction without elevated   filling pressure.   4. No significant valvular disease.   5. PASP 27 mmHg.   6. No pericardial effusion.

## 2019-11-07 DIAGNOSIS — R00.1 BRADYCARDIA, UNSPECIFIED: ICD-10-CM

## 2019-11-07 DIAGNOSIS — N17.9 ACUTE KIDNEY FAILURE, UNSPECIFIED: ICD-10-CM

## 2019-11-07 DIAGNOSIS — I51.7 CARDIOMEGALY: ICD-10-CM

## 2019-11-07 DIAGNOSIS — D69.6 THROMBOCYTOPENIA, UNSPECIFIED: ICD-10-CM

## 2019-11-07 DIAGNOSIS — I16.1 HYPERTENSIVE EMERGENCY: ICD-10-CM

## 2019-11-09 ENCOUNTER — INPATIENT (INPATIENT)
Facility: HOSPITAL | Age: 54
LOS: 0 days | Discharge: HOME | DRG: 683 | End: 2019-11-10
Payer: COMMERCIAL

## 2019-11-09 VITALS
WEIGHT: 160.06 LBS | DIASTOLIC BLOOD PRESSURE: 108 MMHG | HEART RATE: 104 BPM | OXYGEN SATURATION: 100 % | TEMPERATURE: 98 F | SYSTOLIC BLOOD PRESSURE: 146 MMHG | RESPIRATION RATE: 18 BRPM

## 2019-11-09 DIAGNOSIS — Z90.49 ACQUIRED ABSENCE OF OTHER SPECIFIED PARTS OF DIGESTIVE TRACT: Chronic | ICD-10-CM

## 2019-11-09 DIAGNOSIS — N17.9 ACUTE KIDNEY FAILURE, UNSPECIFIED: ICD-10-CM

## 2019-11-09 DIAGNOSIS — R63.8 OTHER SYMPTOMS AND SIGNS CONCERNING FOOD AND FLUID INTAKE: ICD-10-CM

## 2019-11-09 DIAGNOSIS — E87.1 HYPO-OSMOLALITY AND HYPONATREMIA: ICD-10-CM

## 2019-11-09 DIAGNOSIS — I10 ESSENTIAL (PRIMARY) HYPERTENSION: ICD-10-CM

## 2019-11-09 DIAGNOSIS — I51.7 CARDIOMEGALY: ICD-10-CM

## 2019-11-09 DIAGNOSIS — R10.9 UNSPECIFIED ABDOMINAL PAIN: ICD-10-CM

## 2019-11-09 DIAGNOSIS — Z29.9 ENCOUNTER FOR PROPHYLACTIC MEASURES, UNSPECIFIED: ICD-10-CM

## 2019-11-09 DIAGNOSIS — R11.2 NAUSEA WITH VOMITING, UNSPECIFIED: ICD-10-CM

## 2019-11-09 DIAGNOSIS — Z91.89 OTHER SPECIFIED PERSONAL RISK FACTORS, NOT ELSEWHERE CLASSIFIED: ICD-10-CM

## 2019-11-09 LAB
ALBUMIN SERPL ELPH-MCNC: 4.8 G/DL — SIGNIFICANT CHANGE UP (ref 3.3–5)
ALP SERPL-CCNC: 107 U/L — SIGNIFICANT CHANGE UP (ref 40–120)
ALT FLD-CCNC: 9 U/L — LOW (ref 10–45)
ANION GAP SERPL CALC-SCNC: 13 MMOL/L — SIGNIFICANT CHANGE UP (ref 5–17)
ANION GAP SERPL CALC-SCNC: 17 MMOL/L — SIGNIFICANT CHANGE UP (ref 5–17)
APPEARANCE UR: CLEAR — SIGNIFICANT CHANGE UP
AST SERPL-CCNC: 22 U/L — SIGNIFICANT CHANGE UP (ref 10–40)
BACTERIA # UR AUTO: PRESENT /HPF
BASOPHILS # BLD AUTO: 0.04 K/UL — SIGNIFICANT CHANGE UP (ref 0–0.2)
BASOPHILS NFR BLD AUTO: 0.6 % — SIGNIFICANT CHANGE UP (ref 0–2)
BILIRUB SERPL-MCNC: 0.4 MG/DL — SIGNIFICANT CHANGE UP (ref 0.2–1.2)
BILIRUB UR-MCNC: ABNORMAL
BUN SERPL-MCNC: 37 MG/DL — HIGH (ref 7–23)
BUN SERPL-MCNC: 40 MG/DL — HIGH (ref 7–23)
CALCIUM SERPL-MCNC: 10.2 MG/DL — SIGNIFICANT CHANGE UP (ref 8.4–10.5)
CALCIUM SERPL-MCNC: 8.8 MG/DL — SIGNIFICANT CHANGE UP (ref 8.4–10.5)
CHLORIDE SERPL-SCNC: 93 MMOL/L — LOW (ref 96–108)
CHLORIDE SERPL-SCNC: 97 MMOL/L — SIGNIFICANT CHANGE UP (ref 96–108)
CO2 SERPL-SCNC: 23 MMOL/L — SIGNIFICANT CHANGE UP (ref 22–31)
CO2 SERPL-SCNC: 26 MMOL/L — SIGNIFICANT CHANGE UP (ref 22–31)
COLOR SPEC: YELLOW — SIGNIFICANT CHANGE UP
CREAT ?TM UR-MCNC: 66 MG/DL — SIGNIFICANT CHANGE UP
CREAT SERPL-MCNC: 1.81 MG/DL — HIGH (ref 0.5–1.3)
CREAT SERPL-MCNC: 2.06 MG/DL — HIGH (ref 0.5–1.3)
DIFF PNL FLD: ABNORMAL
EOSINOPHIL # BLD AUTO: 0.03 K/UL — SIGNIFICANT CHANGE UP (ref 0–0.5)
EOSINOPHIL NFR BLD AUTO: 0.4 % — SIGNIFICANT CHANGE UP (ref 0–6)
GLUCOSE SERPL-MCNC: 104 MG/DL — HIGH (ref 70–99)
GLUCOSE SERPL-MCNC: 93 MG/DL — SIGNIFICANT CHANGE UP (ref 70–99)
GLUCOSE UR QL: NEGATIVE — SIGNIFICANT CHANGE UP
HCT VFR BLD CALC: 46.6 % — SIGNIFICANT CHANGE UP (ref 39–50)
HGB BLD-MCNC: 16.2 G/DL — SIGNIFICANT CHANGE UP (ref 13–17)
HYALINE CASTS # UR AUTO: ABNORMAL /LPF (ref 0–2)
IMM GRANULOCYTES NFR BLD AUTO: 0.3 % — SIGNIFICANT CHANGE UP (ref 0–1.5)
KETONES UR-MCNC: ABNORMAL MG/DL
LEUKOCYTE ESTERASE UR-ACNC: NEGATIVE — SIGNIFICANT CHANGE UP
LIDOCAIN IGE QN: 18 U/L — SIGNIFICANT CHANGE UP (ref 7–60)
LYMPHOCYTES # BLD AUTO: 1.73 K/UL — SIGNIFICANT CHANGE UP (ref 1–3.3)
LYMPHOCYTES # BLD AUTO: 24 % — SIGNIFICANT CHANGE UP (ref 13–44)
MCHC RBC-ENTMCNC: 31.7 PG — SIGNIFICANT CHANGE UP (ref 27–34)
MCHC RBC-ENTMCNC: 34.8 GM/DL — SIGNIFICANT CHANGE UP (ref 32–36)
MCV RBC AUTO: 91.2 FL — SIGNIFICANT CHANGE UP (ref 80–100)
MONOCYTES # BLD AUTO: 0.74 K/UL — SIGNIFICANT CHANGE UP (ref 0–0.9)
MONOCYTES NFR BLD AUTO: 10.3 % — SIGNIFICANT CHANGE UP (ref 2–14)
NEUTROPHILS # BLD AUTO: 4.65 K/UL — SIGNIFICANT CHANGE UP (ref 1.8–7.4)
NEUTROPHILS NFR BLD AUTO: 64.4 % — SIGNIFICANT CHANGE UP (ref 43–77)
NITRITE UR-MCNC: NEGATIVE — SIGNIFICANT CHANGE UP
NRBC # BLD: 0 /100 WBCS — SIGNIFICANT CHANGE UP (ref 0–0)
OSMOLALITY SERPL: 287 MOSMOL/KG — SIGNIFICANT CHANGE UP (ref 275–300)
OSMOLALITY UR: 265 MOSMOL/KG — SIGNIFICANT CHANGE UP (ref 100–650)
PH UR: 6 — SIGNIFICANT CHANGE UP (ref 5–8)
PLATELET # BLD AUTO: 243 K/UL — SIGNIFICANT CHANGE UP (ref 150–400)
POTASSIUM SERPL-MCNC: 4.5 MMOL/L — SIGNIFICANT CHANGE UP (ref 3.5–5.3)
POTASSIUM SERPL-MCNC: 4.9 MMOL/L — SIGNIFICANT CHANGE UP (ref 3.5–5.3)
POTASSIUM SERPL-SCNC: 4.5 MMOL/L — SIGNIFICANT CHANGE UP (ref 3.5–5.3)
POTASSIUM SERPL-SCNC: 4.9 MMOL/L — SIGNIFICANT CHANGE UP (ref 3.5–5.3)
PROT SERPL-MCNC: 8.7 G/DL — HIGH (ref 6–8.3)
PROT UR-MCNC: ABNORMAL MG/DL
RAPID RVP RESULT: SIGNIFICANT CHANGE UP
RBC # BLD: 5.11 M/UL — SIGNIFICANT CHANGE UP (ref 4.2–5.8)
RBC # FLD: 12.2 % — SIGNIFICANT CHANGE UP (ref 10.3–14.5)
RBC CASTS # UR COMP ASSIST: < 5 /HPF — SIGNIFICANT CHANGE UP
SODIUM SERPL-SCNC: 133 MMOL/L — LOW (ref 135–145)
SODIUM SERPL-SCNC: 136 MMOL/L — SIGNIFICANT CHANGE UP (ref 135–145)
SODIUM UR-SCNC: 43 MMOL/L — SIGNIFICANT CHANGE UP
SP GR SPEC: >=1.03 — SIGNIFICANT CHANGE UP (ref 1–1.03)
UROBILINOGEN FLD QL: 0.2 E.U./DL — SIGNIFICANT CHANGE UP
WBC # BLD: 7.21 K/UL — SIGNIFICANT CHANGE UP (ref 3.8–10.5)
WBC # FLD AUTO: 7.21 K/UL — SIGNIFICANT CHANGE UP (ref 3.8–10.5)
WBC UR QL: < 5 /HPF — SIGNIFICANT CHANGE UP

## 2019-11-09 PROCEDURE — 99285 EMERGENCY DEPT VISIT HI MDM: CPT

## 2019-11-09 PROCEDURE — 71046 X-RAY EXAM CHEST 2 VIEWS: CPT | Mod: 26

## 2019-11-09 PROCEDURE — 93010 ELECTROCARDIOGRAM REPORT: CPT

## 2019-11-09 PROCEDURE — 99223 1ST HOSP IP/OBS HIGH 75: CPT | Mod: GC

## 2019-11-09 PROCEDURE — 74018 RADEX ABDOMEN 1 VIEW: CPT | Mod: 26

## 2019-11-09 RX ORDER — CHLORPROMAZINE HCL 10 MG
25 TABLET ORAL ONCE
Refills: 0 | Status: COMPLETED | OUTPATIENT
Start: 2019-11-09 | End: 2019-11-09

## 2019-11-09 RX ORDER — FAMOTIDINE 10 MG/ML
20 INJECTION INTRAVENOUS ONCE
Refills: 0 | Status: COMPLETED | OUTPATIENT
Start: 2019-11-09 | End: 2019-11-09

## 2019-11-09 RX ORDER — ONDANSETRON 8 MG/1
4 TABLET, FILM COATED ORAL ONCE
Refills: 0 | Status: COMPLETED | OUTPATIENT
Start: 2019-11-09 | End: 2019-11-09

## 2019-11-09 RX ORDER — SODIUM CHLORIDE 9 MG/ML
1000 INJECTION INTRAMUSCULAR; INTRAVENOUS; SUBCUTANEOUS ONCE
Refills: 0 | Status: COMPLETED | OUTPATIENT
Start: 2019-11-09 | End: 2019-11-09

## 2019-11-09 RX ORDER — SODIUM CHLORIDE 9 MG/ML
1000 INJECTION INTRAMUSCULAR; INTRAVENOUS; SUBCUTANEOUS
Refills: 0 | Status: DISCONTINUED | OUTPATIENT
Start: 2019-11-09 | End: 2019-11-10

## 2019-11-09 RX ORDER — AMLODIPINE BESYLATE 2.5 MG/1
10 TABLET ORAL DAILY
Refills: 0 | Status: DISCONTINUED | OUTPATIENT
Start: 2019-11-09 | End: 2019-11-10

## 2019-11-09 RX ORDER — INFLUENZA VIRUS VACCINE 15; 15; 15; 15 UG/.5ML; UG/.5ML; UG/.5ML; UG/.5ML
0.5 SUSPENSION INTRAMUSCULAR ONCE
Refills: 0 | Status: DISCONTINUED | OUTPATIENT
Start: 2019-11-09 | End: 2019-11-10

## 2019-11-09 RX ORDER — ONDANSETRON 8 MG/1
4 TABLET, FILM COATED ORAL EVERY 6 HOURS
Refills: 0 | Status: DISCONTINUED | OUTPATIENT
Start: 2019-11-09 | End: 2019-11-10

## 2019-11-09 RX ADMIN — SODIUM CHLORIDE 1000 MILLILITER(S): 9 INJECTION INTRAMUSCULAR; INTRAVENOUS; SUBCUTANEOUS at 10:00

## 2019-11-09 RX ADMIN — SODIUM CHLORIDE 100 MILLILITER(S): 9 INJECTION INTRAMUSCULAR; INTRAVENOUS; SUBCUTANEOUS at 17:15

## 2019-11-09 RX ADMIN — ONDANSETRON 4 MILLIGRAM(S): 8 TABLET, FILM COATED ORAL at 09:06

## 2019-11-09 RX ADMIN — FAMOTIDINE 20 MILLIGRAM(S): 10 INJECTION INTRAVENOUS at 09:06

## 2019-11-09 RX ADMIN — SODIUM CHLORIDE 1000 MILLILITER(S): 9 INJECTION INTRAMUSCULAR; INTRAVENOUS; SUBCUTANEOUS at 14:48

## 2019-11-09 RX ADMIN — Medication 25 MILLIGRAM(S): at 13:52

## 2019-11-09 RX ADMIN — SODIUM CHLORIDE 1000 MILLILITER(S): 9 INJECTION INTRAMUSCULAR; INTRAVENOUS; SUBCUTANEOUS at 09:05

## 2019-11-09 NOTE — H&P ADULT - ASSESSMENT
Pt with hx of HTN, LVH, heavy marijuana user with a  recent admission (2-5th Nov) for htn emergency presenting with complaints of abdominal pain, nausea, vomiting and diarrhea for 4 days noted on labs to have DANAY, admitted for further management

## 2019-11-09 NOTE — ED PROVIDER NOTE - CLINICAL SUMMARY MEDICAL DECISION MAKING FREE TEXT BOX
abd pain n/v/d. pt htn upon arrival but reports he did not take his medication. fluid, pepcid and zofran given. pt well appearing. labs noted. creatinine elevated and increased from prior. + hiccups improved initially with pepcid but returned. abd non tender. suspect viral etiology. cough and cxr no acute pathology. RVP negative. BMP repeated after fluid and creatinine remains elevated. will plan for admission

## 2019-11-09 NOTE — ED ADULT NURSE REASSESSMENT NOTE - NS ED NURSE REASSESS COMMENT FT1
pt hiccups returned. Given water and peanut and butter jelly sandwich, was able to tolerate without emesis.

## 2019-11-09 NOTE — H&P ADULT - PROBLEM SELECTOR PLAN 4
PT was recently admitted for htn emergency , s/p nicardipine drip. On that admission he had an DANAY, with echo and ekg notable for LVH. Pt was discharged on oral amlodipine 10 mg, hydral 50 tid, lisinopril 20 mg and chlorthalidone 25 mg .   Pt reports compliance with his medications  Given DANAY will hold on lisinopril and chlorthalidone for now  start amlodipine 10 mg daily today and resume hydral as tolerated

## 2019-11-09 NOTE — ED PROVIDER NOTE - ENMT, MLM
[place] Airway patent, Nasal mucosa clear. Mouth with normal mucosa. Throat has no vesicles, no oropharyngeal exudates and uvula is midline.

## 2019-11-09 NOTE — H&P ADULT - PROBLEM SELECTOR PLAN 5
Likely in setting of uncontrolled HTN  continue to manage bp na 133 on labs, likely in setting of chlorthalidone use as well as dehydration  continue to trend, f/u urine, serum osm and obtain urine na.

## 2019-11-09 NOTE — H&P ADULT - NSHPPHYSICALEXAM_GEN_ALL_CORE
.  VITAL SIGNS:  T(C): 36.7 (11-09-19 @ 07:49), Max: 36.7 (11-09-19 @ 07:49)  T(F): 98.1 (11-09-19 @ 07:49), Max: 98.1 (11-09-19 @ 07:49)  HR: 76 (11-09-19 @ 08:46) (76 - 104)  BP: 146/95 (11-09-19 @ 08:46) (146/95 - 146/108)  BP(mean): --  RR: 18 (11-09-19 @ 08:46) (18 - 18)  SpO2: 98% (11-09-19 @ 08:46) (98% - 100%)  Wt(kg): --    PHYSICAL EXAM:    Constitutional: WDWN resting comfortably in bed; NAD  Head: NC/AT  Eyes: PERRL, EOMI, anicteric sclera  ENT: no nasal discharge; uvula midline, no oropharyngeal erythema or exudates; dry mucous membranes   Neck: supple; no JVD or thyromegaly  Respiratory: CTA B/L; no W/R/R, no retractions  Cardiac: +S1/S2; RRR; no M/R/G; PMI non-displaced  Gastrointestinal: abdomen soft, NT/ND; no rebound or guarding; +BSx4  Back: spine midline, no bony tenderness or step-offs; no CVAT B/L  Extremities: WWP, no clubbing or cyanosis; no peripheral edema  Musculoskeletal: NROM x4; no joint swelling, tenderness or erythema  Neurologic: AAOx3; CNII-XII grossly intact; no focal deficits

## 2019-11-09 NOTE — H&P ADULT - NSHPLABSRESULTS_GEN_ALL_CORE
.  LABS:                         16.2   7.21  )-----------( 243      ( 09 Nov 2019 09:06 )             46.6     11-09    133<L>  |  97  |  37<H>  ----------------------------<  93  4.5   |  23  |  1.81<H>    Ca    8.8      09 Nov 2019 10:36    TPro  8.7<H>  /  Alb  4.8  /  TBili  0.4  /  DBili  x   /  AST  22  /  ALT  9<L>  /  AlkPhos  107  11-09      Urinalysis Basic - ( 09 Nov 2019 10:01 )    Color: Yellow / Appearance: Clear / SG: >=1.030 / pH: x  Gluc: x / Ketone: Trace mg/dL  / Bili: Small / Urobili: 0.2 E.U./dL   Blood: x / Protein: Trace mg/dL / Nitrite: NEGATIVE   Leuk Esterase: NEGATIVE / RBC: < 5 /HPF / WBC < 5 /HPF   Sq Epi: x / Non Sq Epi: x / Bacteria: Present /HPF              < from: Echocardiogram (11.05.19 @ 09:03) >    CONCLUSIONS:     1. Moderate symmetric left ventricular hypertrophy.   2. Normal left and right ventricular size and systolic function.   3. Grade I left ventricular diastolic dysfunction without elevated   filling pressure.   4. No significant valvular disease.   5. PASP 27 mmHg.   6. No pericardial effusion.    < end of copied text >

## 2019-11-09 NOTE — H&P ADULT - PROBLEM SELECTOR PLAN 3
Patient reports abdominal pain which is  intermittent, aching in nature, located around umbilicus, non radiating. It was associated with hiccups, nausea and vomiting  Unclear etiology however in setting of recent 1 week hx of runny nose consider viral gastroenteritis , versus partial sbo ? ( reports hx of ruptured appendix , s/p surgery ) versus marijuana hyperemesis syndrome   Obtain Abdominal xray  c/w iv zofran prn for nausea and vomiting  could consider iv benzos for cannabis hyperemesis syndrome versus topical capsaicin cream to abdomen   GI PCR, stool culture  Hold of antibiotics for now ,as no fever, no wbc  continue with iv hydration  can give tylenol prn for now Patient reports abdominal pain which is  intermittent, aching in nature, located around umbilicus, non radiating. It was associated with hiccups, nausea and vomiting  Unclear etiology however in setting of recent 1 week hx of runny nose consider viral gastroenteritis , versus partial sbo ? 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

## 2019-11-09 NOTE — H&P ADULT - PROBLEM SELECTOR PLAN 2
Unclear etiology however in setting of recent 1 week hx of runny nose consider viral gastroenteritis , versus partial sbo ? ( reports hx of ruptured appendix , s/p surgery ) versus marijuana hyperemesis syndrome   Obtain Abdominal xray  c/w iv zofran prn for nausea and vomiting  could consider iv benzos for cannabis hyperemesis syndrome versus topical capsaicin cream to abdomen   GI PCR, stool culture  Hold of antibiotics for now ,as no fever, no wbc  continue with iv hydration Unclear etiology however in setting of recent 1 week hx of runny nose consider viral gastroenteritis , versus partial sbo ? ( reports hx of ruptured appendix , s/p surgery ) versus marijuana hyperemesis syndrome   Obtain Abdominal xray  c/w iv zofran prn for nausea and vomiting  could consider iv benzos for cannabis hyperemesis syndrome versus topical capsaicin cream to abdomen   GI PCR, stool culture  Hold of antibiotics for now ,as no fever, no wbc  continue with iv hydration  xray abdomen

## 2019-11-09 NOTE — ED ADULT NURSE NOTE - OBJECTIVE STATEMENT
53 yo M pmhx HTN presents to Ed c/o abdominal pain and hiccups since Wednesday. Pt states, " I woke up wednesday with nausea and diarrhea and then I started getting hiccups on and off and nothing will make it go away I haven't eaten since Tuesday." Pt reports that he can only keep down water. Able to pass gas, has diarrhea multiple times per day, occasional cough and rhinorrhea. Upon assessment pt abdomen is nondistended, slightly tender to deep palpation in medial abdomen, bladder nontender. Denies any SOB, fever, chills, heamturia, burning upon urination, blood in stool, hemoptysis, numbness/ tingling. 20g in L AC, VSS, NAD, will continue to evaluate.

## 2019-11-09 NOTE — H&P ADULT - PROBLEM SELECTOR PLAN 7
F: Continue with iv maintenance fluids  E: REPLETE AS NEEDED  N: DASH/TLC  D: RMF DVT PPX: yo jimenez, scd  GI ppx: none

## 2019-11-09 NOTE — ED PROVIDER NOTE - OBJECTIVE STATEMENT
53 y/o male with hx of HTN c/o abd pain, n/v/d x 3 days. pt states intermittent cramping abd pain with nausea and vomiting. multiple episodes of NBNB emesis.  + watery diarrhea. no fever or chills. no cp or sob. pt reports sx's began after recent admission for HTN. Pt admits to generalized weakness and non productive cough as well. no sore throat or nasal congestion. no further complaints.

## 2019-11-09 NOTE — ED PROVIDER NOTE - CARE PLAN
Principal Discharge DX:	Acute renal failure  Secondary Diagnosis:	Nausea and vomiting  Secondary Diagnosis:	Hiccups

## 2019-11-09 NOTE — ED ADULT NURSE REASSESSMENT NOTE - NS ED NURSE REASSESS COMMENT FT1
pt resting in stretcher without hiccups currently. Pt reports," my nausea is a little bit better." Pending lab results. NAD, will continue to monitor.

## 2019-11-09 NOTE — H&P ADULT - HISTORY OF PRESENT ILLNESS
Pt with hx of HTN, LVH, heavy marijuana user with a  recent admission (2-5th Nov) for htn emergency presenting with complaints of abdominal pain, nausea, vomiting and diarrhea for 4 days. Pt was in usual state of health 4 days ago when he started having abdominal pain. It was intermittent, aching in nature, located around umbilicus, non radiating. It was associated with hiccups, nausea and vomiting . He reports vomiting 4-5 times a day which is NBNB containing food particles. He reports diarrhea for 4 days . It is described as brown, watery with intermittent episodes of having formed stool. He denies any travel, no fever, no chills, reports eating primarily at home. On ros he reports runny nose over last week but no other sx. Pt says he is able to  pass gas and reports hx of abdominal surgery in the 90s( ruptured appendix ). Last BM was yesterday and last episode of vomiting was prior to presenting to ED. Of note he was recently admitted for htn emergency , s/p nicardipine drip. On that admission he had an DANAY, with echo and ekg notable for LVH. Pt was discharged on oral amlodipine 10 mg, hydral 50 tid, lisinopril 20 mg and chlorthalidone 25 mg . Pt reports compliance with his medications  vitals on arrival --> 71, AFEBRILE, bp 146/108, satting well on ra  labs with na 136--> 133 (s/p 1l N.S ), bun 40--> 37 , cr 2.06--> 1.81, UA positive for ketones , trace protein, trace blood and few hyaline casts  ed management: s/p iv pepcid 20mg, xofran 4 mg, Thorazine 25 mg oral for hiccups , NACL 1 L bolus   pt admitted for further management Pt with hx of HTN, LVH, heavy marijuana user with a  recent admission (2-5th Nov) for htn emergency presenting with complaints of abdominal pain, nausea, vomiting and diarrhea for 4 days. Pt was in usual state of health 4 days ago when he started having abdominal pain. It was intermittent, aching in nature, located around umbilicus, non radiating. It was associated with hiccups, nausea and vomiting . He reports vomiting 4-5 times a day which is NBNB containing food particles. He reports diarrhea for 4 days . It is described as brown, watery with intermittent episodes of having formed stool. He denies any travel, no fever, no chills, reports eating primarily at home. No reported hx of gerd , no hoarseness no reflux  On ros he reports runny nose over last week but no other sx. Pt says he is able to  pass gas and reports hx of abdominal surgery in the 90s( ruptured appendix ). Last BM was yesterday and last episode of vomiting was prior to presenting to ED. Of note he was recently admitted for htn emergency , s/p nicardipine drip. On that admission he had an DANAY, with echo and ekg notable for LVH. Pt was discharged on oral amlodipine 10 mg, hydral 50 tid, lisinopril 20 mg and chlorthalidone 25 mg . Pt reports compliance with his medications  vitals on arrival --> 71, AFEBRILE, bp 146/108, satting well on ra  labs with na 136--> 133 (s/p 1l N.S ), bun 40--> 37 , cr 2.06--> 1.81, UA positive for ketones , trace protein, trace blood and few hyaline casts  ed management: s/p iv pepcid 20mg, xofran 4 mg, Thorazine 25 mg oral for hiccups , NACL 1 L bolus   pt admitted for further management

## 2019-11-09 NOTE — H&P ADULT - PROBLEM SELECTOR PLAN 1
Pt presenting with nausea, vomiting and abdominal pain noted to have DANAY on labs  bun 40--> 37 , cr 2.06--> 1.81,  Likely prerenal as BUN /CR ratio is > 20 also with UA notable for ketones  S/P IV 1L in ED, with improvement in renal function  continue with iv hydration and obtain urine lytes  orthostatics Pt presenting with nausea, vomiting and abdominal pain noted to have DANAY on labs  bun 40--> 37 , cr 2.06--> 1.81,  Likely prerenal as BUN /CR ratio is > 20 also with UA notable for ketones  S/P IV 1L in ED, with improvement in renal function  continue with iv hydration and obtain urine lytes  orthostatics  addendum: FENA 0.9 consistent with pre-renal etiology

## 2019-11-09 NOTE — ED PROVIDER NOTE - ATTENDING CONTRIBUTION TO CARE
53 yo male h/o recently diagnosed htn w admit for htn urgency 11/4-6 dc'd on hydralazine, amlodipine, chlorthalidone, lisinopril returns today c/o n/v/d, hiccups w/o abd pain, gen weakness x 3 d w/o sick contacts.  Pt notes mild cough w clear sputum w/o cp/sob, + rhinorrhea w/o other uri sx.  No 53 yo male h/o recently diagnosed htn w admit for htn urgency 11/4-6 dc'd on hydralazine, amlodipine, chlorthalidone, lisinopril returns today c/o n/v/d, hiccups w/o abd pain, gen weakness x 3 d w/o sick contacts.  Pt notes mild cough w clear sputum w/o cp/sob, + rhinorrhea w/o other uri sx.  No fever.  No travel, undercooked/suspicious foods, h/o food intolerance.  Well appearing, + freq hiccups, nad, nc/at, lung cta, heart reg, abd soft, nt, ext no gross deformity, no gross neuro deficits   Abd soft/nt w c/o n/v/d w/o abd pain.  ? viral syndrome since also w mild rhinorrhea/cough, ? viral gi, ? food borne illness, no ttp to suggest cholecystitis, appendicitis, colitis, diverticulitis.  Plan labs, gi meds, ivf; no indication based on exam for emergent ct abd.  Reassess.

## 2019-11-09 NOTE — ED ADULT NURSE REASSESSMENT NOTE - NS ED NURSE REASSESS COMMENT FT1
Pt sitting in stretcher eating, currently has hiccups. Pending sign out, plan of care explained. NAD, will continue to monitor.

## 2019-11-10 ENCOUNTER — TRANSCRIPTION ENCOUNTER (OUTPATIENT)
Age: 54
End: 2019-11-10

## 2019-11-10 VITALS
RESPIRATION RATE: 17 BRPM | TEMPERATURE: 97 F | DIASTOLIC BLOOD PRESSURE: 88 MMHG | HEART RATE: 66 BPM | OXYGEN SATURATION: 100 % | SYSTOLIC BLOOD PRESSURE: 169 MMHG

## 2019-11-10 DIAGNOSIS — R11.2 NAUSEA WITH VOMITING, UNSPECIFIED: ICD-10-CM

## 2019-11-10 DIAGNOSIS — R06.6 HICCOUGH: ICD-10-CM

## 2019-11-10 LAB
ANION GAP SERPL CALC-SCNC: 11 MMOL/L — SIGNIFICANT CHANGE UP (ref 5–17)
ANION GAP SERPL CALC-SCNC: 9 MMOL/L — SIGNIFICANT CHANGE UP (ref 5–17)
BUN SERPL-MCNC: 28 MG/DL — HIGH (ref 7–23)
BUN SERPL-MCNC: 33 MG/DL — HIGH (ref 7–23)
CALCIUM SERPL-MCNC: 8.7 MG/DL — SIGNIFICANT CHANGE UP (ref 8.4–10.5)
CALCIUM SERPL-MCNC: 9 MG/DL — SIGNIFICANT CHANGE UP (ref 8.4–10.5)
CHLORIDE SERPL-SCNC: 101 MMOL/L — SIGNIFICANT CHANGE UP (ref 96–108)
CHLORIDE SERPL-SCNC: 99 MMOL/L — SIGNIFICANT CHANGE UP (ref 96–108)
CO2 SERPL-SCNC: 23 MMOL/L — SIGNIFICANT CHANGE UP (ref 22–31)
CO2 SERPL-SCNC: 27 MMOL/L — SIGNIFICANT CHANGE UP (ref 22–31)
CREAT ?TM UR-MCNC: 116 MG/DL — SIGNIFICANT CHANGE UP
CREAT SERPL-MCNC: 1.73 MG/DL — HIGH (ref 0.5–1.3)
CREAT SERPL-MCNC: 2.02 MG/DL — HIGH (ref 0.5–1.3)
GLUCOSE SERPL-MCNC: 94 MG/DL — SIGNIFICANT CHANGE UP (ref 70–99)
GLUCOSE SERPL-MCNC: 97 MG/DL — SIGNIFICANT CHANGE UP (ref 70–99)
HCT VFR BLD CALC: 37.8 % — LOW (ref 39–50)
HGB BLD-MCNC: 12.8 G/DL — LOW (ref 13–17)
MAGNESIUM SERPL-MCNC: 2.2 MG/DL — SIGNIFICANT CHANGE UP (ref 1.6–2.6)
MCHC RBC-ENTMCNC: 31.8 PG — SIGNIFICANT CHANGE UP (ref 27–34)
MCHC RBC-ENTMCNC: 33.9 GM/DL — SIGNIFICANT CHANGE UP (ref 32–36)
MCV RBC AUTO: 93.8 FL — SIGNIFICANT CHANGE UP (ref 80–100)
NRBC # BLD: 0 /100 WBCS — SIGNIFICANT CHANGE UP (ref 0–0)
PLATELET # BLD AUTO: 184 K/UL — SIGNIFICANT CHANGE UP (ref 150–400)
POTASSIUM SERPL-MCNC: 4 MMOL/L — SIGNIFICANT CHANGE UP (ref 3.5–5.3)
POTASSIUM SERPL-MCNC: 4.7 MMOL/L — SIGNIFICANT CHANGE UP (ref 3.5–5.3)
POTASSIUM SERPL-SCNC: 4 MMOL/L — SIGNIFICANT CHANGE UP (ref 3.5–5.3)
POTASSIUM SERPL-SCNC: 4.7 MMOL/L — SIGNIFICANT CHANGE UP (ref 3.5–5.3)
RBC # BLD: 4.03 M/UL — LOW (ref 4.2–5.8)
RBC # FLD: 12.3 % — SIGNIFICANT CHANGE UP (ref 10.3–14.5)
SODIUM SERPL-SCNC: 135 MMOL/L — SIGNIFICANT CHANGE UP (ref 135–145)
SODIUM SERPL-SCNC: 135 MMOL/L — SIGNIFICANT CHANGE UP (ref 135–145)
SODIUM UR-SCNC: 85 MMOL/L — SIGNIFICANT CHANGE UP
WBC # BLD: 5.14 K/UL — SIGNIFICANT CHANGE UP (ref 3.8–10.5)
WBC # FLD AUTO: 5.14 K/UL — SIGNIFICANT CHANGE UP (ref 3.8–10.5)

## 2019-11-10 PROCEDURE — 87798 DETECT AGENT NOS DNA AMP: CPT

## 2019-11-10 PROCEDURE — 80048 BASIC METABOLIC PNL TOTAL CA: CPT

## 2019-11-10 PROCEDURE — 81001 URINALYSIS AUTO W/SCOPE: CPT

## 2019-11-10 PROCEDURE — 83690 ASSAY OF LIPASE: CPT

## 2019-11-10 PROCEDURE — 71046 X-RAY EXAM CHEST 2 VIEWS: CPT

## 2019-11-10 PROCEDURE — 85027 COMPLETE CBC AUTOMATED: CPT

## 2019-11-10 PROCEDURE — 87507 IADNA-DNA/RNA PROBE TQ 12-25: CPT

## 2019-11-10 PROCEDURE — 80053 COMPREHEN METABOLIC PANEL: CPT

## 2019-11-10 PROCEDURE — 96375 TX/PRO/DX INJ NEW DRUG ADDON: CPT

## 2019-11-10 PROCEDURE — 99233 SBSQ HOSP IP/OBS HIGH 50: CPT | Mod: GC

## 2019-11-10 PROCEDURE — 36415 COLL VENOUS BLD VENIPUNCTURE: CPT

## 2019-11-10 PROCEDURE — 96361 HYDRATE IV INFUSION ADD-ON: CPT

## 2019-11-10 PROCEDURE — 93005 ELECTROCARDIOGRAM TRACING: CPT

## 2019-11-10 PROCEDURE — 96374 THER/PROPH/DIAG INJ IV PUSH: CPT

## 2019-11-10 PROCEDURE — 84300 ASSAY OF URINE SODIUM: CPT

## 2019-11-10 PROCEDURE — 83735 ASSAY OF MAGNESIUM: CPT

## 2019-11-10 PROCEDURE — 87486 CHLMYD PNEUM DNA AMP PROBE: CPT

## 2019-11-10 PROCEDURE — 82570 ASSAY OF URINE CREATININE: CPT

## 2019-11-10 PROCEDURE — 87046 STOOL CULTR AEROBIC BACT EA: CPT

## 2019-11-10 PROCEDURE — 87581 M.PNEUMON DNA AMP PROBE: CPT

## 2019-11-10 PROCEDURE — 85025 COMPLETE CBC W/AUTO DIFF WBC: CPT

## 2019-11-10 PROCEDURE — 83930 ASSAY OF BLOOD OSMOLALITY: CPT

## 2019-11-10 PROCEDURE — 74018 RADEX ABDOMEN 1 VIEW: CPT

## 2019-11-10 PROCEDURE — 87633 RESP VIRUS 12-25 TARGETS: CPT

## 2019-11-10 PROCEDURE — 87045 FECES CULTURE AEROBIC BACT: CPT

## 2019-11-10 PROCEDURE — 99285 EMERGENCY DEPT VISIT HI MDM: CPT | Mod: 25

## 2019-11-10 PROCEDURE — 83935 ASSAY OF URINE OSMOLALITY: CPT

## 2019-11-10 RX ORDER — SODIUM CHLORIDE 9 MG/ML
1000 INJECTION, SOLUTION INTRAVENOUS
Refills: 0 | Status: DISCONTINUED | OUTPATIENT
Start: 2019-11-10 | End: 2019-11-10

## 2019-11-10 RX ORDER — SODIUM CHLORIDE 9 MG/ML
1000 INJECTION, SOLUTION INTRAVENOUS ONCE
Refills: 0 | Status: COMPLETED | OUTPATIENT
Start: 2019-11-10 | End: 2019-11-10

## 2019-11-10 RX ADMIN — SODIUM CHLORIDE 120 MILLILITER(S): 9 INJECTION, SOLUTION INTRAVENOUS at 12:48

## 2019-11-10 RX ADMIN — SODIUM CHLORIDE 120 MILLILITER(S): 9 INJECTION, SOLUTION INTRAVENOUS at 19:12

## 2019-11-10 RX ADMIN — SODIUM CHLORIDE 100 MILLILITER(S): 9 INJECTION, SOLUTION INTRAVENOUS at 10:29

## 2019-11-10 RX ADMIN — AMLODIPINE BESYLATE 10 MILLIGRAM(S): 2.5 TABLET ORAL at 05:15

## 2019-11-10 NOTE — DISCHARGE NOTE NURSING/CASE MANAGEMENT/SOCIAL WORK - NSDCFUADDAPPT_GEN_ALL_CORE_FT
An appointment has been made for you with [facility] at [address] at 11:30 am. Please arrive 30 minutes before your appointment.

## 2019-11-10 NOTE — PROGRESS NOTE ADULT - ASSESSMENT
Pt with hx of HTN, LVH, heavy marijuana user with a  recent admission (2-5th Nov) for htn emergency presenting with complaints of abdominal pain, nausea, vomiting and diarrhea for 4 days noted on labs to have DANAY, admitted for further management Pt with hx of HTN, LVH, heavy marijuana user with a  recent admission (2-5th Nov) for htn emergency presenting with complaints of abdominal pain, nausea, vomiting and diarrhea for 4 days noted on labs to have DANAY, admitted for further management.

## 2019-11-10 NOTE — DISCHARGE NOTE PROVIDER - NSDCCPCAREPLAN_GEN_ALL_CORE_FT
PRINCIPAL DISCHARGE DIAGNOSIS  Diagnosis: Acute kidney injury  Assessment and Plan of Treatment: You were admitted for an elevated creatinine level likely secondary to dehydration from your diarrhea and vomiting. It is important to follow up with primary doctor to have your creatinine level checked this week. Also, it is imperative that you drink copious amounts of fluids to help with this kidney injury.      SECONDARY DISCHARGE DIAGNOSES  Diagnosis: Hypertension, unspecified type  Assessment and Plan of Treatment: Your hypertension medications were held due to your acute kidney injury. It is important to see your doctor on Tuesday for guidance on when to take these medications. In the meantime, please only take your amlodipine until your doctor can ensure it is safe to begin taking the other 3 medications.    Diagnosis: Hyponatremia  Assessment and Plan of Treatment: Your sodium levels in your blood were lower than normal. This is likely due to your acute kidney injury. This level quickly corrected after hydration with IV fluids. Continue to drink copious amounts of clear liquids. PRINCIPAL DISCHARGE DIAGNOSIS  Diagnosis: Acute kidney injury  Assessment and Plan of Treatment: You were admitted for an elevated creatinine level likely secondary to dehydration from your diarrhea and vomiting. It is important to follow up with primary doctor to have your creatinine level checked this week. Also, it is imperative that you drink copious amounts of fluids to help with this kidney injury.      SECONDARY DISCHARGE DIAGNOSES  Diagnosis: Hypertension, unspecified type  Assessment and Plan of Treatment: Your hypertension medications were held due to your acute kidney injury. It is important to see your doctor on Tuesday for guidance on when to take these medications. In the meantime, please only take your amlodipine until your doctor can ensure it is safe to begin taking the other 3 medications.  It is important check your blood pressure daily. If your blood pressure is greater than  systolic pressure is above 180, please contact your doctor.    Diagnosis: Hyponatremia  Assessment and Plan of Treatment: Your sodium levels in your blood were lower than normal. This is likely due to your acute kidney injury. This level quickly corrected after hydration with IV fluids. Continue to drink copious amounts of clear liquids.

## 2019-11-10 NOTE — DISCHARGE NOTE PROVIDER - HOSPITAL COURSE
Mr. Majano is a 54 year old male with hx of HTN, LVH, heavy marijuana user with a recent admission (2-5th Nov) for htn emergency presenting with complaints of abdominal pain, nausea, vomiting and diarrhea for 4 days noted on labs to have DANAY.        Problem List/Main Diagnoses (system-based):     -DANAY    -HTN    -Hyponatremia    -LVH        Inpatient treatment course:     On 11/9, patient reported to ED with nausea, vomiting, and diarrhea. Labs were notable for a creatinine of 2.06. He was admitted for management of DANAY and potential gastroenteritis vs. hyperemesis syndrome. On 11/10, patient diarrhea and nausea had subsided. Creatinine improved to 1.73. The discharge instructions and follow up plan were communicated with patient and his wife. They voiced their understanding. He was stable upon discharge.         New medications:     -None    Labs to be followed outpatient:     -Creatinine    -Potassium        Exam to be followed outpatient:     -None

## 2019-11-10 NOTE — PROGRESS NOTE ADULT - PROBLEM SELECTOR PLAN 2
Unclear etiology however in setting of recent 1 week hx of runny nose consider viral gastroenteritis , versus partial sbo ? ( reports hx of ruptured appendix , s/p surgery ) versus marijuana hyperemesis syndrome   Obtain Abdominal xray  c/w iv zofran prn for nausea and vomiting  could consider iv benzos for cannabis hyperemesis syndrome versus topical capsaicin cream to abdomen   GI PCR, stool culture  Hold of antibiotics for now ,as no fever, no wbc  continue with iv hydration  xray abdomen -Unclear etiology however in setting of recent 1 week hx of runny nose consider viral gastroenteritis versus marijuana hyperemesis syndrome   -Obtain Abdominal xray  -c/w iv zofran prn for nausea and vomiting  -could consider iv benzos for cannabis hyperemesis syndrome versus topical capsaicin cream to abdomen PRN for vomiting  -GI PCR, stool culture  -Hold of antibiotics for now ,as no fever, no wbc  -continue with iv hydration  -F/U xray abdomen

## 2019-11-10 NOTE — PROGRESS NOTE ADULT - PROBLEM SELECTOR PLAN 3
Patient reports abdominal pain which is  intermittent, aching in nature, located around umbilicus, non radiating. It was associated with hiccups, nausea and vomiting  Unclear etiology however in setting of recent 1 week hx of runny nose consider viral gastroenteritis , versus partial sbo ? 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 It was associated with hiccups, nausea and vomiting  Unclear etiology however in setting of recent 1 week hx of runny nose consider viral gastroenteritis , versus partial sbo ? 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

## 2019-11-10 NOTE — PROGRESS NOTE ADULT - SUBJECTIVE AND OBJECTIVE BOX
INTERVAL HPI/OVERNIGHT EVENTS:  Patient was seen and examined at bedside. As per nurse and patient, no o/n events, patient resting comfortably. No complaints at this time. Patient denies: fever, chills, dizziness, weakness, HA, Changes in vision, CP, palpitations, SOB, cough, N/V/D/C, dysuria, changes in bowel movements, LE edema. ROS otherwise negative.    Vital Signs Last 24 Hrs  T(C): 36.8 (10 Nov 2019 05:16), Max: 37 (09 Nov 2019 21:42)  T(F): 98.2 (10 Nov 2019 05:16), Max: 98.6 (09 Nov 2019 21:42)  HR: 68 (10 Nov 2019 05:16) (68 - 82)  BP: 144/76 (10 Nov 2019 05:16) (128/81 - 144/76)  RR: 17 (10 Nov 2019 05:16) (16 - 19)  SpO2: 100% (10 Nov 2019 05:16) (100% - 100%)    PHYSICAL EXAM:    Constitutional: WDWN, NAD  HEENT: PERRL, EOMI, sclera non-icteric, neck supple, trachea midline, no masses, no JVD, MMM, good dentition  Respiratory: CTA b/l, good air entry b/l, no wheezing, no rhonchi, no rales, without accessory muscle use and no intercostal retractions  Cardiovascular: RRR, normal S1S2, no M/R/G  Gastrointestinal: soft, NTND, no masses palpable, BS normal  Extremities: Warm, well perfused, pulses equal bilateral upper and lower extremities, no edema, no clubbing  Neurological: AAOx3, CN Grossly intact  Skin: Normal temperature, warm, dry    MEDICATIONS  (STANDING):  amLODIPine   Tablet 10 milliGRAM(s) Oral daily  influenza   Vaccine 0.5 milliLiter(s) IntraMuscular once  sodium chloride 0.9%. 1000 milliLiter(s) (100 mL/Hr) IV Continuous <Continuous>    MEDICATIONS  (PRN):  ondansetron Injectable 4 milliGRAM(s) IV Push every 6 hours PRN Nausea and/or Vomiting      Allergies    No Known Allergies    Intolerances        LABS:                        12.8   5.14  )-----------( 184      ( 10 Nov 2019 06:37 )             37.8     11-10    135  |  101  |  33<H>  ----------------------------<  94  4.0   |  23  |  2.02<H>    Ca    8.7      10 Nov 2019 06:37  Mg     2.2     11-10    TPro  8.7<H>  /  Alb  4.8  /  TBili  0.4  /  DBili  x   /  AST  22  /  ALT  9<L>  /  AlkPhos  107  11-09      Urinalysis Basic - ( 09 Nov 2019 10:01 )    Color: Yellow / Appearance: Clear / SG: >=1.030 / pH: x  Gluc: x / Ketone: Trace mg/dL  / Bili: Small / Urobili: 0.2 E.U./dL   Blood: x / Protein: Trace mg/dL / Nitrite: NEGATIVE   Leuk Esterase: NEGATIVE / RBC: < 5 /HPF / WBC < 5 /HPF   Sq Epi: x / Non Sq Epi: x / Bacteria: Present /HPF        RADIOLOGY & ADDITIONAL TESTS:  Reviewed INTERVAL HPI/OVERNIGHT EVENTS:  Mr. Majano states his nausea, vomiting, and diarrhea have resolved. He states he is hungry and feeling much better. Denies any fevers, SOB, chest pain.     Vital Signs Last 24 Hrs  T(C): 36.8 (10 Nov 2019 05:16), Max: 37 (09 Nov 2019 21:42)  T(F): 98.2 (10 Nov 2019 05:16), Max: 98.6 (09 Nov 2019 21:42)  HR: 68 (10 Nov 2019 05:16) (68 - 82)  BP: 144/76 (10 Nov 2019 05:16) (128/81 - 144/76)  RR: 17 (10 Nov 2019 05:16) (16 - 19)  SpO2: 100% (10 Nov 2019 05:16) (100% - 100%)    PHYSICAL EXAM:    Constitutional: WDWN, NAD  HEENT: PERRL, EOMI, sclera non-icteric, neck supple, trachea midline, no masses, no JVD, MMM, good dentition  Respiratory: CTA b/l, good air entry b/l, no wheezing, no rhonchi, no rales, without accessory muscle use and no intercostal retractions  Cardiovascular: RRR, normal S1S2, no M/R/G  Gastrointestinal: soft, NTND, no masses palpable, BS normal  Extremities: Warm, well perfused, pulses equal bilateral upper and lower extremities, no edema, no clubbing  Neurological: AAOx3, CN Grossly intact  Skin: Normal temperature, warm, dry    LABS:                         12.8   5.14  )-----------( 184      ( 10 Nov 2019 06:37 )             37.8     11-10    135  |  101  |  33<H>  ----------------------------<  94  4.0   |  23  |  2.02<H>    Ca    8.7      10 Nov 2019 06:37  Mg     2.2     11-10    TPro  8.7<H>  /  Alb  4.8  /  TBili  0.4  /  DBili  x   /  AST  22  /  ALT  9<L>  /  AlkPhos  107  11-09      Urinalysis Basic - ( 09 Nov 2019 10:01 )    Color: Yellow / Appearance: Clear / SG: >=1.030 / pH: x  Gluc: x / Ketone: Trace mg/dL  / Bili: Small / Urobili: 0.2 E.U./dL   Blood: x / Protein: Trace mg/dL / Nitrite: NEGATIVE   Leuk Esterase: NEGATIVE / RBC: < 5 /HPF / WBC < 5 /HPF   Sq Epi: x / Non Sq Epi: x / Bacteria: Present /HPF                RADIOLOGY, EKG & ADDITIONAL TESTS: Reviewed.       Allergies    No Known Allergies    Intolerances        LABS:                        12.8   5.14  )-----------( 184      ( 10 Nov 2019 06:37 )             37.8     11-10    135  |  101  |  33<H>  ----------------------------<  94  4.0   |  23  |  2.02<H>    Ca    8.7      10 Nov 2019 06:37  Mg     2.2     11-10    TPro  8.7<H>  /  Alb  4.8  /  TBili  0.4  /  DBili  x   /  AST  22  /  ALT  9<L>  /  AlkPhos  107  11-09      Urinalysis Basic - ( 09 Nov 2019 10:01 )    Color: Yellow / Appearance: Clear / SG: >=1.030 / pH: x  Gluc: x / Ketone: Trace mg/dL  / Bili: Small / Urobili: 0.2 E.U./dL   Blood: x / Protein: Trace mg/dL / Nitrite: NEGATIVE   Leuk Esterase: NEGATIVE / RBC: < 5 /HPF / WBC < 5 /HPF   Sq Epi: x / Non Sq Epi: x / Bacteria: Present /HPF        RADIOLOGY & ADDITIONAL TESTS:  Reviewed INTERVAL HPI/OVERNIGHT EVENTS:  Mr. Majano states his nausea, vomiting, and diarrhea have resolved. He states he is hungry and feeling much better. Denies any fevers, SOB, chest pain. 12 pt ROS is otherwise negative    Vital Signs Last 24 Hrs  T(C): 36.8 (10 Nov 2019 05:16), Max: 37 (09 Nov 2019 21:42)  T(F): 98.2 (10 Nov 2019 05:16), Max: 98.6 (09 Nov 2019 21:42)  HR: 68 (10 Nov 2019 05:16) (68 - 82)  BP: 144/76 (10 Nov 2019 05:16) (128/81 - 144/76)  RR: 17 (10 Nov 2019 05:16) (16 - 19)  SpO2: 100% (10 Nov 2019 05:16) (100% - 100%)    PHYSICAL EXAM:    Constitutional: WDWN, NAD  HEENT: PERRL, EOMI, sclera non-icteric, neck supple, trachea midline, no masses, no JVD, MMM, good dentition  Respiratory: CTA b/l, good air entry b/l, no wheezing, no rhonchi, no rales, without accessory muscle use and no intercostal retractions  Cardiovascular: RRR, normal S1S2, no M/R/G  Gastrointestinal: soft, NTND, no masses palpable, BS normal  Extremities: Warm, well perfused, pulses equal bilateral upper and lower extremities, no edema, no clubbing  Neurological: AAOx3, CN Grossly intact  Skin: Normal temperature, warm, dry  Back: midline  Psych: normal affect  Lymph: no LAD  MSK: no joint swelling    LABS:                         12.8   5.14  )-----------( 184      ( 10 Nov 2019 06:37 )             37.8     11-10    135  |  101  |  33<H>  ----------------------------<  94  4.0   |  23  |  2.02<H>    Ca    8.7      10 Nov 2019 06:37  Mg     2.2     11-10    TPro  8.7<H>  /  Alb  4.8  /  TBili  0.4  /  DBili  x   /  AST  22  /  ALT  9<L>  /  AlkPhos  107  11-09      Urinalysis Basic - ( 09 Nov 2019 10:01 )    Color: Yellow / Appearance: Clear / SG: >=1.030 / pH: x  Gluc: x / Ketone: Trace mg/dL  / Bili: Small / Urobili: 0.2 E.U./dL   Blood: x / Protein: Trace mg/dL / Nitrite: NEGATIVE   Leuk Esterase: NEGATIVE / RBC: < 5 /HPF / WBC < 5 /HPF   Sq Epi: x / Non Sq Epi: x / Bacteria: Present /HPF                RADIOLOGY, EKG & ADDITIONAL TESTS: Reviewed.       Allergies    No Known Allergies    Intolerances        LABS:                        12.8   5.14  )-----------( 184      ( 10 Nov 2019 06:37 )             37.8     11-10    135  |  101  |  33<H>  ----------------------------<  94  4.0   |  23  |  2.02<H>    Ca    8.7      10 Nov 2019 06:37  Mg     2.2     11-10    TPro  8.7<H>  /  Alb  4.8  /  TBili  0.4  /  DBili  x   /  AST  22  /  ALT  9<L>  /  AlkPhos  107  11-09      Urinalysis Basic - ( 09 Nov 2019 10:01 )    Color: Yellow / Appearance: Clear / SG: >=1.030 / pH: x  Gluc: x / Ketone: Trace mg/dL  / Bili: Small / Urobili: 0.2 E.U./dL   Blood: x / Protein: Trace mg/dL / Nitrite: NEGATIVE   Leuk Esterase: NEGATIVE / RBC: < 5 /HPF / WBC < 5 /HPF   Sq Epi: x / Non Sq Epi: x / Bacteria: Present /HPF        RADIOLOGY & ADDITIONAL TESTS:  Reviewed

## 2019-11-10 NOTE — DISCHARGE NOTE PROVIDER - NSDCFUADDAPPT_GEN_ALL_CORE_FT
An appointment has been made for you with [facility] at [address] at 11:30 am. Please arrive 30 minutes before your appointment.

## 2019-11-10 NOTE — PROGRESS NOTE ADULT - PROBLEM SELECTOR PLAN 5
na 133 on labs, likely in setting of chlorthalidone use as well as dehydration  continue to trend, f/u urine, serum osm and obtain urine na.

## 2019-11-10 NOTE — DISCHARGE NOTE PROVIDER - CARE PROVIDER_API CALL
Radha Hebert)  Cardiovascular Disease  [address] [place] [address]  Phone: [phone]  Fax: [phone]  Follow Up Time:

## 2019-11-10 NOTE — DISCHARGE NOTE PROVIDER - NSDCFUSCHEDAPPT_GEN_ALL_CORE_FT
RIC ECHOLS ; 11/12/2019 ; [facility] [address] RIC ECHOLS ; 12/17/2019 ; [facility] [address]

## 2019-11-10 NOTE — DISCHARGE NOTE NURSING/CASE MANAGEMENT/SOCIAL WORK - PATIENT PORTAL LINK FT
You can access the FollowMyHealth Patient Portal offered by [facility] by registering at the following website: [email or web address]. By joining [employer]’s FollowMyHealth portal, you will also be able to view your health information using other applications (apps) compatible with our system.

## 2019-11-10 NOTE — PROGRESS NOTE ADULT - PROBLEM SELECTOR PLAN 1
Pt presenting with nausea, vomiting and abdominal pain noted to have DANAY on labs  bun 40--> 37 , cr 2.06--> 1.81,  Likely prerenal as BUN /CR ratio is > 20 also with UA notable for ketones  S/P IV 1L in ED, with improvement in renal function  continue with iv hydration and obtain urine lytes  orthostatics  addendum: FENA 0.9 consistent with pre-renal etiology -Pt presenting with nausea, vomiting and abdominal pain noted to have DANAY on labs  bun 40--> 37 , cr 2.06--> 1.81,  -Likely prerenal as BUN /CR ratio is > 20 also with UA notable for ketones  -S/P IV 1L in ED, with improvement in renal function  -continue with iv hydration and obtain urine lytes; NS discontinued for LR  -orthostatics  -FENA 0.9 consistent with pre-renal etiology  -Cr 2.02 on 11/10. Continue LR IVF -Pt presenting with nausea, vomiting and abdominal pain noted to have DANAY on labs  bun 40--> 37 , cr 2.06--> 1.81,  -Likely prerenal as BUN /CR ratio is > 20 also with UA notable for ketones  -S/P IV 1L in ED, with improvement in renal function  -continue with iv hydration and obtain urine lytes; NS discontinued for LR  -orthostatics  -FENA 0.9 consistent with pre-renal etiology  -Cr 2.02 on 11/10. Continue LR IVF  -F/u BMP in PM after IV hydration

## 2019-11-11 LAB
CULTURE RESULTS: SIGNIFICANT CHANGE UP
SPECIMEN SOURCE: SIGNIFICANT CHANGE UP

## 2019-11-12 ENCOUNTER — APPOINTMENT (OUTPATIENT)
Dept: HEART AND VASCULAR | Facility: CLINIC | Age: 54
End: 2019-11-12
Payer: MEDICAID

## 2019-11-12 ENCOUNTER — LABORATORY RESULT (OUTPATIENT)
Age: 54
End: 2019-11-12

## 2019-11-12 ENCOUNTER — NON-APPOINTMENT (OUTPATIENT)
Age: 54
End: 2019-11-12

## 2019-11-12 VITALS
HEART RATE: 90 BPM | HEIGHT: 71 IN | BODY MASS INDEX: 20.3 KG/M2 | DIASTOLIC BLOOD PRESSURE: 90 MMHG | OXYGEN SATURATION: 98 % | SYSTOLIC BLOOD PRESSURE: 164 MMHG | WEIGHT: 145 LBS

## 2019-11-12 DIAGNOSIS — F19.90 OTHER PSYCHOACTIVE SUBSTANCE USE, UNSPECIFIED, UNCOMPLICATED: ICD-10-CM

## 2019-11-12 DIAGNOSIS — N18.9 CHRONIC KIDNEY DISEASE, UNSPECIFIED: ICD-10-CM

## 2019-11-12 DIAGNOSIS — Z82.49 FAMILY HISTORY OF ISCHEMIC HEART DISEASE AND OTHER DISEASES OF THE CIRCULATORY SYSTEM: ICD-10-CM

## 2019-11-12 DIAGNOSIS — I10 ESSENTIAL (PRIMARY) HYPERTENSION: ICD-10-CM

## 2019-11-12 PROBLEM — Z00.00 ENCOUNTER FOR PREVENTIVE HEALTH EXAMINATION: Status: ACTIVE | Noted: 2019-11-12

## 2019-11-12 LAB
CULTURE RESULTS: SIGNIFICANT CHANGE UP
SPECIMEN SOURCE: SIGNIFICANT CHANGE UP

## 2019-11-12 PROCEDURE — 93000 ELECTROCARDIOGRAM COMPLETE: CPT

## 2019-11-12 PROCEDURE — 99213 OFFICE O/P EST LOW 20 MIN: CPT

## 2019-11-12 RX ORDER — HYDRALAZINE HYDROCHLORIDE 50 MG/1
50 TABLET ORAL 3 TIMES DAILY
Qty: 90 | Refills: 4 | Status: ACTIVE | COMMUNITY
Start: 2019-11-12 | End: 1900-01-01

## 2019-11-12 RX ORDER — HYDRALAZINE HYDROCHLORIDE 50 MG/1
50 TABLET ORAL 3 TIMES DAILY
Qty: 90 | Refills: 4 | Status: ACTIVE | COMMUNITY
Start: 2019-11-12

## 2019-11-12 RX ORDER — AMLODIPINE BESYLATE 10 MG/1
10 TABLET ORAL DAILY
Qty: 30 | Refills: 5 | Status: ACTIVE | COMMUNITY
Start: 2019-11-12

## 2019-11-12 NOTE — DISCUSSION/SUMMARY
[FreeTextEntry1] : Assessment/Plan:\par \par Patient is 54 year old male with HTN who was recently DC'd from [facility] 11/5 sec to HTN emergency, DANAY. After DC pt returned to [facility] 11/10 with likely gastroenteritis and hyponatremia,worsening kidney function. Pt dc'd after IVF and improved Crt (1.7).\par \par \par -Feels well without anginal equivalents. EKG unchanged.\par \par -HTN: BP still elevated. With likely longstanding untreated HTN as pt has not seen MD in 10 years. Was originally on amlodipine/chlorthalidone/lisinopril/hydralazine. Chlor and lisinopril DC'd on last admission due to DANAY and hyponatremia. Recheck electrolytes and CMP today. Cont amlodipine. Restart hydralazine 50 mg tid.\par \par \par Fu 1 month

## 2019-11-12 NOTE — PHYSICAL EXAM
[General Appearance - Well Developed] : well developed [Normal Appearance] : normal appearance [Well Groomed] : well groomed [General Appearance - Well Nourished] : well nourished [No Deformities] : no deformities [Normal Conjunctiva] : the conjunctiva exhibited no abnormalities [General Appearance - In No Acute Distress] : no acute distress [Normal Oral Mucosa] : normal oral mucosa [Eyelids - No Xanthelasma] : the eyelids demonstrated no xanthelasmas [No Oral Cyanosis] : no oral cyanosis [No Oral Pallor] : no oral pallor [Normal Jugular Venous A Waves Present] : normal jugular venous A waves present [Normal Jugular Venous V Waves Present] : normal jugular venous V waves present [Heart Rate And Rhythm] : heart rate and rhythm were normal [No Jugular Venous Rubio A Waves] : no jugular venous rubio A waves [Heart Sounds] : normal S1 and S2 [Murmurs] : no murmurs present [Respiration, Rhythm And Depth] : normal respiratory rhythm and effort [Auscultation Breath Sounds / Voice Sounds] : lungs were clear to auscultation bilaterally [Exaggerated Use Of Accessory Muscles For Inspiration] : no accessory muscle use [Abdomen Soft] : soft [Abdomen Tenderness] : non-tender [Abnormal Walk] : normal gait [Abdomen Mass (___ Cm)] : no abdominal mass palpated [Gait - Sufficient For Exercise Testing] : the gait was sufficient for exercise testing [Nail Clubbing] : no clubbing of the fingernails [Petechial Hemorrhages (___cm)] : no petechial hemorrhages [Cyanosis, Localized] : no localized cyanosis [Skin Color & Pigmentation] : normal skin color and pigmentation [] : no ischemic changes [No Venous Stasis] : no venous stasis [Skin Lesions] : no skin lesions [No Skin Ulcers] : no skin ulcer [Oriented To Time, Place, And Person] : oriented to person, place, and time [No Xanthoma] : no  xanthoma was observed [Affect] : the affect was normal [Mood] : the mood was normal [No Anxiety] : not feeling anxious

## 2019-11-12 NOTE — HISTORY OF PRESENT ILLNESS
[FreeTextEntry1] : Patient is 54 year old male with HTN who was recently DC'd from [facility] 11/5 sec to HTN emergency, DANAY. After DC pt returned to [facility] 11/10 with likely gastroenteritis and hyponatremia,worsening kidney function. Pt dc'd after IVF and improved Crt (1.7).\par \par Today states he feels with resolved GI symptoms. Denies active chest pain/SOB at rest or with exertion. No dizziness, palpitations. No LOC. No headaches, blurry vision, focal weakness.

## 2019-11-13 DIAGNOSIS — N17.9 ACUTE KIDNEY FAILURE, UNSPECIFIED: ICD-10-CM

## 2019-11-13 DIAGNOSIS — R11.2 NAUSEA WITH VOMITING, UNSPECIFIED: ICD-10-CM

## 2019-11-13 DIAGNOSIS — F12.99 CANNABIS USE, UNSPECIFIED WITH UNSPECIFIED CANNABIS-INDUCED DISORDER: ICD-10-CM

## 2019-11-13 DIAGNOSIS — K52.9 NONINFECTIVE GASTROENTERITIS AND COLITIS, UNSPECIFIED: ICD-10-CM

## 2019-11-13 DIAGNOSIS — I10 ESSENTIAL (PRIMARY) HYPERTENSION: ICD-10-CM

## 2019-11-13 DIAGNOSIS — E87.1 HYPO-OSMOLALITY AND HYPONATREMIA: ICD-10-CM

## 2019-11-13 DIAGNOSIS — Z79.899 OTHER LONG TERM (CURRENT) DRUG THERAPY: ICD-10-CM

## 2019-11-13 DIAGNOSIS — R06.6 HICCOUGH: ICD-10-CM

## 2019-12-17 ENCOUNTER — APPOINTMENT (OUTPATIENT)
Dept: HEART AND VASCULAR | Facility: CLINIC | Age: 54
End: 2019-12-17
Payer: MEDICAID

## 2019-12-17 VITALS
BODY MASS INDEX: 22.4 KG/M2 | HEART RATE: 65 BPM | HEIGHT: 71 IN | SYSTOLIC BLOOD PRESSURE: 180 MMHG | DIASTOLIC BLOOD PRESSURE: 82 MMHG | WEIGHT: 160 LBS

## 2019-12-17 VITALS — SYSTOLIC BLOOD PRESSURE: 160 MMHG | BODY MASS INDEX: 31.25 KG/M2 | DIASTOLIC BLOOD PRESSURE: 86 MMHG | HEIGHT: 60 IN

## 2019-12-17 PROCEDURE — 99213 OFFICE O/P EST LOW 20 MIN: CPT

## 2019-12-17 RX ORDER — LISINOPRIL 20 MG/1
20 TABLET ORAL DAILY
Qty: 1 | Refills: 5 | Status: ACTIVE | COMMUNITY
Start: 2019-12-17

## 2019-12-17 NOTE — PHYSICAL EXAM
[Normal Appearance] : normal appearance [General Appearance - Well Developed] : well developed [Well Groomed] : well groomed [No Deformities] : no deformities [General Appearance - Well Nourished] : well nourished [General Appearance - In No Acute Distress] : no acute distress [Normal Conjunctiva] : the conjunctiva exhibited no abnormalities [Eyelids - No Xanthelasma] : the eyelids demonstrated no xanthelasmas [Normal Oral Mucosa] : normal oral mucosa [No Oral Cyanosis] : no oral cyanosis [No Oral Pallor] : no oral pallor [Normal Jugular Venous A Waves Present] : normal jugular venous A waves present [Normal Jugular Venous V Waves Present] : normal jugular venous V waves present [No Jugular Venous Rubio A Waves] : no jugular venous rubio A waves [Respiration, Rhythm And Depth] : normal respiratory rhythm and effort [Auscultation Breath Sounds / Voice Sounds] : lungs were clear to auscultation bilaterally [Exaggerated Use Of Accessory Muscles For Inspiration] : no accessory muscle use [Heart Rate And Rhythm] : heart rate and rhythm were normal [Heart Sounds] : normal S1 and S2 [Murmurs] : no murmurs present [Abdomen Soft] : soft [Abdomen Tenderness] : non-tender [Abdomen Mass (___ Cm)] : no abdominal mass palpated [Gait - Sufficient For Exercise Testing] : the gait was sufficient for exercise testing [Abnormal Walk] : normal gait [Nail Clubbing] : no clubbing of the fingernails [Cyanosis, Localized] : no localized cyanosis [Petechial Hemorrhages (___cm)] : no petechial hemorrhages [Skin Color & Pigmentation] : normal skin color and pigmentation [] : no rash [No Venous Stasis] : no venous stasis [No Skin Ulcers] : no skin ulcer [Skin Lesions] : no skin lesions [No Xanthoma] : no  xanthoma was observed [Affect] : the affect was normal [Oriented To Time, Place, And Person] : oriented to person, place, and time [Mood] : the mood was normal [No Anxiety] : not feeling anxious

## 2019-12-17 NOTE — HISTORY OF PRESENT ILLNESS
[FreeTextEntry1] : Patient is 54 year old male with HTN DC'd from [facility] 11/5 sec to HTN emergency, DANAY. After DC pt returned to [facility] 11/10 with likely gastroenteritis and hyponatremia,worsening kidney function. Pt dc'd after IVF and improved Crt (1.7). Seen ~ 1 month ago with elevated BP, restarted on hydralazine (with amlodipine). Pt here today for follow up of BP. States BP at home ~ 125-140/85-90. However pt has had recent stress at home which has caused elevated BP last 1 week.\par \par Feels well. Denies active chest pain/SOB at rest or with exertion. No dizziness, palpitations. No LOC. No headaches, blurry vision, focal weakness.

## 2019-12-17 NOTE — DISCUSSION/SUMMARY
[FreeTextEntry1] : Assessment/Plan:\par Patient is 54 year old male with HTN DC'd from [facility] 11/5 sec to HTN emergency, DANAY. After DC pt returned to [facility] 11/10 with likely gastroenteritis and hyponatremia,worsening kidney function. Pt dc'd after IVF and improved Crt (1.7). Seen ~ 1 month ago with elevated BP, restarted on hydralazine (with amlodipine). Pt here today for follow up of BP. States BP at home ~ 125-140/85-90. However pt has had recent stress at home which has caused elevated BP last 1 week.\par \par -HTN: BP uncontrolled, however likely due to recent stress. Crt normalized to baseline ~ 1.4 Restart previous dose of lisinopril 20 mg qd. Cont amlodpine/hydralazine\par \par Fu 1 month\par

## 2020-02-11 ENCOUNTER — APPOINTMENT (OUTPATIENT)
Dept: HEART AND VASCULAR | Facility: CLINIC | Age: 55
End: 2020-02-11
Payer: MEDICAID

## 2020-02-11 ENCOUNTER — NON-APPOINTMENT (OUTPATIENT)
Age: 55
End: 2020-02-11

## 2020-02-11 VITALS — HEART RATE: 60 BPM | DIASTOLIC BLOOD PRESSURE: 82 MMHG | SYSTOLIC BLOOD PRESSURE: 128 MMHG

## 2020-02-11 VITALS
WEIGHT: 163 LBS | BODY MASS INDEX: 32 KG/M2 | SYSTOLIC BLOOD PRESSURE: 160 MMHG | HEART RATE: 56 BPM | DIASTOLIC BLOOD PRESSURE: 84 MMHG | HEIGHT: 60 IN

## 2020-02-11 PROCEDURE — 99214 OFFICE O/P EST MOD 30 MIN: CPT

## 2020-02-11 NOTE — PHYSICAL EXAM
[General Appearance - Well Developed] : well developed [Normal Appearance] : normal appearance [Well Groomed] : well groomed [General Appearance - Well Nourished] : well nourished [No Deformities] : no deformities [General Appearance - In No Acute Distress] : no acute distress [Normal Conjunctiva] : the conjunctiva exhibited no abnormalities [Eyelids - No Xanthelasma] : the eyelids demonstrated no xanthelasmas [Normal Oral Mucosa] : normal oral mucosa [No Oral Cyanosis] : no oral cyanosis [No Oral Pallor] : no oral pallor [Normal Jugular Venous V Waves Present] : normal jugular venous V waves present [Normal Jugular Venous A Waves Present] : normal jugular venous A waves present [No Jugular Venous Rubio A Waves] : no jugular venous rubio A waves [Exaggerated Use Of Accessory Muscles For Inspiration] : no accessory muscle use [Respiration, Rhythm And Depth] : normal respiratory rhythm and effort [Auscultation Breath Sounds / Voice Sounds] : lungs were clear to auscultation bilaterally [Heart Rate And Rhythm] : heart rate and rhythm were normal [Murmurs] : no murmurs present [Heart Sounds] : normal S1 and S2 [Abdomen Soft] : soft [Abdomen Tenderness] : non-tender [Abdomen Mass (___ Cm)] : no abdominal mass palpated [Abnormal Walk] : normal gait [Gait - Sufficient For Exercise Testing] : the gait was sufficient for exercise testing [Nail Clubbing] : no clubbing of the fingernails [Cyanosis, Localized] : no localized cyanosis [Petechial Hemorrhages (___cm)] : no petechial hemorrhages [] : no rash [No Venous Stasis] : no venous stasis [Skin Color & Pigmentation] : normal skin color and pigmentation [No Skin Ulcers] : no skin ulcer [Skin Lesions] : no skin lesions [No Xanthoma] : no  xanthoma was observed [Oriented To Time, Place, And Person] : oriented to person, place, and time [Affect] : the affect was normal [No Anxiety] : not feeling anxious [Mood] : the mood was normal

## 2020-02-11 NOTE — HISTORY OF PRESENT ILLNESS
[FreeTextEntry1] : Patient is 54 year old male with HTN DC'd from [facility] 11/5 sec to HTN emergency, DANAY here for follow up of BP. Last visit pt started on lisinopril with normalization of Crt and increased dose hydralazine. Pt states Bp checked since then has ranged 120-130/80-90. \par \par Feels well. Denies active chest pain/SOB at rest or with exertion. No dizziness, palpitations. No LOC. No headaches, blurry vision, focal weakness.

## 2020-02-11 NOTE — DISCUSSION/SUMMARY
[FreeTextEntry1] : Assessment/Plan:\par Patient is 54 year old male with HTN DC'd from [facility] 11/5 sec to HTN emergency, DANAY here for follow up of BP. Last visit pt started on lisinopril with normalization of Crt and increased dose hydralazine. Pt states Bp checked since then has ranged 120-130/80-90. \par \par -HTN: BP now controlled on current regimen. Cont  lisinopril 20 mg qd/ amlodpine/hydralazine\par \par Cardiac status stable. FU 4 months\par

## 2020-05-01 ENCOUNTER — OUTPATIENT (OUTPATIENT)
Dept: OUTPATIENT SERVICES | Facility: HOSPITAL | Age: 55
LOS: 1 days | End: 2020-05-01
Payer: MEDICAID

## 2020-05-01 DIAGNOSIS — Z90.49 ACQUIRED ABSENCE OF OTHER SPECIFIED PARTS OF DIGESTIVE TRACT: Chronic | ICD-10-CM

## 2020-05-01 PROCEDURE — G9001: CPT

## 2020-05-26 DIAGNOSIS — Z71.89 OTHER SPECIFIED COUNSELING: ICD-10-CM

## 2020-06-02 ENCOUNTER — NON-APPOINTMENT (OUTPATIENT)
Age: 55
End: 2020-06-02

## 2020-06-02 ENCOUNTER — APPOINTMENT (OUTPATIENT)
Dept: HEART AND VASCULAR | Facility: CLINIC | Age: 55
End: 2020-06-02

## 2020-06-02 VITALS — TEMPERATURE: 98.4 F

## 2021-01-19 NOTE — PATIENT PROFILE ADULT - VISION (WITH CORRECTIVE LENSES IF THE PATIENT USUALLY WEARS THEM):
Normal vision: sees adequately in most situations; can see medication labels, newsprint Condition:: Acne Please Describe Your Condition:: Started flaring worse over the last year\\nHas only tried Epiduo forte in the past,  but very drying and not very effective\\nInvolvement is mainly on face

## 2021-10-06 PROBLEM — I10 ESSENTIAL HYPERTENSION: Status: ACTIVE | Noted: 2019-11-12

## 2021-12-28 NOTE — ED PROVIDER NOTE - DATE/TIME 2
Patient brought in by ambulance, intoxicated, found sitting in the street 02-Nov-2019 14:24 [place] Counseling:  I discussed with the patient the risks of imiquimod including but not limited to erythema, scaling, itching, weeping, crusting, and pain.  Patient understands that the inflammatory response to imiquimod is variable from person to person and was educated regarded proper titration schedule.  If flu-like symptoms develop, patient knows to discontinue the medication and contact us.

## 2023-06-28 NOTE — DISCHARGE NOTE PROVIDER - CARE PROVIDERS DIRECT ADDRESSES
[email or web address] Spironolactone Pregnancy And Lactation Text: This medication can cause feminization of the male fetus and should be avoided during pregnancy. The active metabolite is also found in breast milk.

## 2024-12-26 NOTE — ED ADULT NURSE NOTE - CHPI ED NUR SYMPTOMS NEG
Symptom management for upper respiratory infections / viral illness    Use water gargles, hot tea, honey or Chloraseptic spray as needed for sore throat  Take Tylenol as needed for pain.  Take Claritin/Allegra/Zyrtec as needed for congestion.   Use Flonase or Nasonex as needed for runny nose or post nasal drip.   Use Neti Pot or nasal lavages as needed for congestion.   Honey (1 teaspoon) as needed  Drink plenty of fluids  Get plenty of rest   Use a cool mist humidifier at night    Sleep on 2-3 pillows  Viral upper respiratory infections can last up to 14 days, the average is 7-11 days.  Follow up with PCP or return to clinic if symptoms are not improving in the next few days.  Go to ER for chest pain, shortness of breath, weakness, dehydration, abdominal pain etc.   
no dizziness/no syncope/no shortness of breath

## 2025-04-23 NOTE — PATIENT PROFILE ADULT - [id number]_A_NUR
Pt was called by this RN, no answer. Left message informing UC is available. She could also call [phone] to schedule with a provider for tomorrow.    None